# Patient Record
Sex: FEMALE | Race: BLACK OR AFRICAN AMERICAN | NOT HISPANIC OR LATINO | URBAN - METROPOLITAN AREA
[De-identification: names, ages, dates, MRNs, and addresses within clinical notes are randomized per-mention and may not be internally consistent; named-entity substitution may affect disease eponyms.]

---

## 2017-10-10 ENCOUNTER — OUTPATIENT (OUTPATIENT)
Dept: OUTPATIENT SERVICES | Facility: HOSPITAL | Age: 52
LOS: 1 days | Discharge: HOME | End: 2017-10-10
Payer: COMMERCIAL

## 2017-10-10 DIAGNOSIS — M06.9 RHEUMATOID ARTHRITIS, UNSPECIFIED: ICD-10-CM

## 2017-10-10 PROCEDURE — 96415 CHEMO IV INFUSION ADDL HR: CPT

## 2017-10-10 PROCEDURE — 96375 TX/PRO/DX INJ NEW DRUG ADDON: CPT

## 2017-10-10 PROCEDURE — 96413 CHEMO IV INFUSION 1 HR: CPT

## 2017-10-10 RX ORDER — RITUXIMAB 10 MG/ML
1000 INJECTION, SOLUTION INTRAVENOUS ONCE
Qty: 0 | Refills: 0 | Status: DISCONTINUED | OUTPATIENT
Start: 2017-10-10 | End: 2017-10-25

## 2017-10-10 RX ORDER — DIPHENHYDRAMINE HCL 50 MG
25 CAPSULE ORAL ONCE
Qty: 0 | Refills: 0 | Status: COMPLETED | OUTPATIENT
Start: 2017-10-10 | End: 2017-10-10

## 2017-10-10 RX ORDER — ACETAMINOPHEN 500 MG
975 TABLET ORAL ONCE
Qty: 0 | Refills: 0 | Status: COMPLETED | OUTPATIENT
Start: 2017-10-10 | End: 2017-10-10

## 2017-10-10 RX ADMIN — Medication 975 MILLIGRAM(S): at 11:07

## 2017-10-10 RX ADMIN — Medication 125 MILLIGRAM(S): at 11:07

## 2017-10-10 RX ADMIN — Medication 25 MILLIGRAM(S): at 11:07

## 2017-10-12 DIAGNOSIS — M06.9 RHEUMATOID ARTHRITIS, UNSPECIFIED: ICD-10-CM

## 2017-10-12 DIAGNOSIS — Z51.11 ENCOUNTER FOR ANTINEOPLASTIC CHEMOTHERAPY: ICD-10-CM

## 2017-10-24 ENCOUNTER — OUTPATIENT (OUTPATIENT)
Dept: OUTPATIENT SERVICES | Facility: HOSPITAL | Age: 52
LOS: 1 days | Discharge: HOME | End: 2017-10-24
Payer: COMMERCIAL

## 2017-10-24 DIAGNOSIS — M06.9 RHEUMATOID ARTHRITIS, UNSPECIFIED: ICD-10-CM

## 2017-10-24 PROCEDURE — 96375 TX/PRO/DX INJ NEW DRUG ADDON: CPT

## 2017-10-24 PROCEDURE — 96413 CHEMO IV INFUSION 1 HR: CPT

## 2017-10-24 PROCEDURE — 96415 CHEMO IV INFUSION ADDL HR: CPT

## 2017-10-24 RX ORDER — RITUXIMAB 10 MG/ML
1000 INJECTION, SOLUTION INTRAVENOUS ONCE
Qty: 0 | Refills: 0 | Status: DISCONTINUED | OUTPATIENT
Start: 2017-10-24 | End: 2017-11-10

## 2017-10-24 RX ORDER — ACETAMINOPHEN 500 MG
650 TABLET ORAL ONCE
Qty: 0 | Refills: 0 | Status: COMPLETED | OUTPATIENT
Start: 2017-10-24 | End: 2017-10-24

## 2017-10-24 RX ORDER — DIPHENHYDRAMINE HCL 50 MG
25 CAPSULE ORAL ONCE
Qty: 0 | Refills: 0 | Status: COMPLETED | OUTPATIENT
Start: 2017-10-24 | End: 2017-10-24

## 2017-10-24 RX ADMIN — Medication 650 MILLIGRAM(S): at 11:33

## 2017-10-24 RX ADMIN — Medication 125 MILLIGRAM(S): at 11:49

## 2017-10-24 RX ADMIN — Medication 25 MILLIGRAM(S): at 11:34

## 2017-10-30 DIAGNOSIS — M06.9 RHEUMATOID ARTHRITIS, UNSPECIFIED: ICD-10-CM

## 2017-10-30 DIAGNOSIS — Z51.11 ENCOUNTER FOR ANTINEOPLASTIC CHEMOTHERAPY: ICD-10-CM

## 2018-03-27 ENCOUNTER — INPATIENT (INPATIENT)
Facility: HOSPITAL | Age: 53
LOS: 3 days | Discharge: HOME | DRG: 419 | End: 2018-03-31
Attending: STUDENT IN AN ORGANIZED HEALTH CARE EDUCATION/TRAINING PROGRAM | Admitting: STUDENT IN AN ORGANIZED HEALTH CARE EDUCATION/TRAINING PROGRAM
Payer: COMMERCIAL

## 2018-03-27 VITALS
SYSTOLIC BLOOD PRESSURE: 122 MMHG | TEMPERATURE: 98 F | WEIGHT: 149.91 LBS | HEART RATE: 65 BPM | RESPIRATION RATE: 22 BRPM | OXYGEN SATURATION: 100 % | HEIGHT: 66 IN | DIASTOLIC BLOOD PRESSURE: 86 MMHG

## 2018-03-27 PROCEDURE — 99220: CPT

## 2018-03-27 NOTE — ED ADULT TRIAGE NOTE - CHIEF COMPLAINT QUOTE
Patient A&Ox4, BIBA complaining of 10/10 abdominal pain x2 hours. Also complaining of nausea. Patient A&Ox4, BIBA complaining of 10/10 abdominal pain x2 hours. Also complaining of nausea. Took Miralax.

## 2018-03-28 DIAGNOSIS — K81.0 ACUTE CHOLECYSTITIS: ICD-10-CM

## 2018-03-28 LAB
ALBUMIN SERPL ELPH-MCNC: 3.8 G/DL — SIGNIFICANT CHANGE UP (ref 3.3–5.2)
ALBUMIN SERPL ELPH-MCNC: 4.2 G/DL — SIGNIFICANT CHANGE UP (ref 3.3–5.2)
ALP SERPL-CCNC: 90 U/L — SIGNIFICANT CHANGE UP (ref 40–120)
ALP SERPL-CCNC: 91 U/L — SIGNIFICANT CHANGE UP (ref 40–120)
ALT FLD-CCNC: 25 U/L — SIGNIFICANT CHANGE UP
ALT FLD-CCNC: 30 U/L — SIGNIFICANT CHANGE UP
ANION GAP SERPL CALC-SCNC: 13 MMOL/L — SIGNIFICANT CHANGE UP (ref 5–17)
ANION GAP SERPL CALC-SCNC: 14 MMOL/L — SIGNIFICANT CHANGE UP (ref 5–17)
ANION GAP SERPL CALC-SCNC: 15 MMOL/L — SIGNIFICANT CHANGE UP (ref 5–17)
ANION GAP SERPL CALC-SCNC: 16 MMOL/L — SIGNIFICANT CHANGE UP (ref 5–17)
ANISOCYTOSIS BLD QL: SLIGHT — SIGNIFICANT CHANGE UP
APPEARANCE UR: CLEAR — SIGNIFICANT CHANGE UP
AST SERPL-CCNC: 26 U/L — SIGNIFICANT CHANGE UP
AST SERPL-CCNC: 32 U/L — HIGH
BASOPHILS # BLD AUTO: 0 K/UL — SIGNIFICANT CHANGE UP (ref 0–0.2)
BASOPHILS NFR BLD AUTO: 0.1 % — SIGNIFICANT CHANGE UP (ref 0–2)
BILIRUB SERPL-MCNC: 0.6 MG/DL — SIGNIFICANT CHANGE UP (ref 0.4–2)
BILIRUB SERPL-MCNC: <0.2 MG/DL — LOW (ref 0.4–2)
BILIRUB UR-MCNC: NEGATIVE — SIGNIFICANT CHANGE UP
BUN SERPL-MCNC: 16 MG/DL — SIGNIFICANT CHANGE UP (ref 8–20)
BUN SERPL-MCNC: 5 MG/DL — LOW (ref 8–20)
BUN SERPL-MCNC: 8 MG/DL — SIGNIFICANT CHANGE UP (ref 8–20)
BUN SERPL-MCNC: 8 MG/DL — SIGNIFICANT CHANGE UP (ref 8–20)
CALCIUM SERPL-MCNC: 8.2 MG/DL — LOW (ref 8.6–10.2)
CALCIUM SERPL-MCNC: 8.2 MG/DL — LOW (ref 8.6–10.2)
CALCIUM SERPL-MCNC: 8.7 MG/DL — SIGNIFICANT CHANGE UP (ref 8.6–10.2)
CALCIUM SERPL-MCNC: 9.5 MG/DL — SIGNIFICANT CHANGE UP (ref 8.6–10.2)
CHLORIDE SERPL-SCNC: 102 MMOL/L — SIGNIFICANT CHANGE UP (ref 98–107)
CHLORIDE SERPL-SCNC: 103 MMOL/L — SIGNIFICANT CHANGE UP (ref 98–107)
CHLORIDE SERPL-SCNC: 105 MMOL/L — SIGNIFICANT CHANGE UP (ref 98–107)
CHLORIDE SERPL-SCNC: 98 MMOL/L — SIGNIFICANT CHANGE UP (ref 98–107)
CO2 SERPL-SCNC: 22 MMOL/L — SIGNIFICANT CHANGE UP (ref 22–29)
CO2 SERPL-SCNC: 24 MMOL/L — SIGNIFICANT CHANGE UP (ref 22–29)
CO2 SERPL-SCNC: 26 MMOL/L — SIGNIFICANT CHANGE UP (ref 22–29)
CO2 SERPL-SCNC: 26 MMOL/L — SIGNIFICANT CHANGE UP (ref 22–29)
COLOR SPEC: YELLOW — SIGNIFICANT CHANGE UP
CREAT SERPL-MCNC: 0.47 MG/DL — LOW (ref 0.5–1.3)
CREAT SERPL-MCNC: 0.48 MG/DL — LOW (ref 0.5–1.3)
CREAT SERPL-MCNC: 0.5 MG/DL — SIGNIFICANT CHANGE UP (ref 0.5–1.3)
CREAT SERPL-MCNC: 0.64 MG/DL — SIGNIFICANT CHANGE UP (ref 0.5–1.3)
DIFF PNL FLD: ABNORMAL
EOSINOPHIL # BLD AUTO: 0 K/UL — SIGNIFICANT CHANGE UP (ref 0–0.5)
EOSINOPHIL NFR BLD AUTO: 0.1 % — SIGNIFICANT CHANGE UP (ref 0–6)
EPI CELLS # UR: ABNORMAL
GAS PNL BLDA: SIGNIFICANT CHANGE UP
GLUCOSE SERPL-MCNC: 100 MG/DL — SIGNIFICANT CHANGE UP (ref 70–115)
GLUCOSE SERPL-MCNC: 101 MG/DL — SIGNIFICANT CHANGE UP (ref 70–115)
GLUCOSE SERPL-MCNC: 153 MG/DL — HIGH (ref 70–115)
GLUCOSE SERPL-MCNC: 84 MG/DL — SIGNIFICANT CHANGE UP (ref 70–115)
GLUCOSE UR QL: NEGATIVE MG/DL — SIGNIFICANT CHANGE UP
HCT VFR BLD CALC: 38 % — SIGNIFICANT CHANGE UP (ref 37–47)
HCT VFR BLD CALC: 38.1 % — SIGNIFICANT CHANGE UP (ref 37–47)
HCT VFR BLD CALC: 38.4 % — SIGNIFICANT CHANGE UP (ref 37–47)
HGB BLD-MCNC: 12.1 G/DL — SIGNIFICANT CHANGE UP (ref 12–16)
HGB BLD-MCNC: 12.3 G/DL — SIGNIFICANT CHANGE UP (ref 12–16)
HGB BLD-MCNC: 12.3 G/DL — SIGNIFICANT CHANGE UP (ref 12–16)
HYPOCHROMIA BLD QL: SLIGHT — SIGNIFICANT CHANGE UP
KETONES UR-MCNC: NEGATIVE — SIGNIFICANT CHANGE UP
LACTATE BLDV-MCNC: 2.5 MMOL/L — HIGH (ref 0.5–2)
LACTATE BLDV-MCNC: 3.9 MMOL/L — HIGH (ref 0.5–2)
LACTATE SERPL-SCNC: 1.7 MMOL/L — SIGNIFICANT CHANGE UP (ref 0.5–2)
LACTATE SERPL-SCNC: 3.3 MMOL/L — HIGH (ref 0.5–2)
LEUKOCYTE ESTERASE UR-ACNC: NEGATIVE — SIGNIFICANT CHANGE UP
LIDOCAIN IGE QN: 34 U/L — SIGNIFICANT CHANGE UP (ref 22–51)
LYMPHOCYTES # BLD AUTO: 0.9 K/UL — LOW (ref 1–4.8)
LYMPHOCYTES # BLD AUTO: 9.8 % — LOW (ref 20–55)
MACROCYTES BLD QL: SLIGHT — SIGNIFICANT CHANGE UP
MAGNESIUM SERPL-MCNC: 1.9 MG/DL — SIGNIFICANT CHANGE UP (ref 1.6–2.6)
MAGNESIUM SERPL-MCNC: 2.1 MG/DL — SIGNIFICANT CHANGE UP (ref 1.8–2.6)
MCHC RBC-ENTMCNC: 21.9 PG — LOW (ref 27–31)
MCHC RBC-ENTMCNC: 22.1 PG — LOW (ref 27–31)
MCHC RBC-ENTMCNC: 22.1 PG — LOW (ref 27–31)
MCHC RBC-ENTMCNC: 31.8 G/DL — LOW (ref 32–36)
MCHC RBC-ENTMCNC: 32 G/DL — SIGNIFICANT CHANGE UP (ref 32–36)
MCHC RBC-ENTMCNC: 32.3 G/DL — SIGNIFICANT CHANGE UP (ref 32–36)
MCV RBC AUTO: 68.5 FL — LOW (ref 81–99)
MCV RBC AUTO: 68.8 FL — LOW (ref 81–99)
MCV RBC AUTO: 68.9 FL — LOW (ref 81–99)
MICROCYTES BLD QL: SLIGHT — SIGNIFICANT CHANGE UP
MONOCYTES # BLD AUTO: 0.4 K/UL — SIGNIFICANT CHANGE UP (ref 0–0.8)
MONOCYTES NFR BLD AUTO: 4.4 % — SIGNIFICANT CHANGE UP (ref 3–10)
NEUTROPHILS # BLD AUTO: 8.2 K/UL — HIGH (ref 1.8–8)
NEUTROPHILS NFR BLD AUTO: 85.4 % — HIGH (ref 37–73)
NITRITE UR-MCNC: NEGATIVE — SIGNIFICANT CHANGE UP
OVALOCYTES BLD QL SMEAR: SLIGHT — SIGNIFICANT CHANGE UP
PH UR: 8 — SIGNIFICANT CHANGE UP (ref 5–8)
PHOSPHATE SERPL-MCNC: 2 MG/DL — LOW (ref 2.4–4.7)
PHOSPHATE SERPL-MCNC: 2.1 MG/DL — LOW (ref 2.4–4.7)
PLAT MORPH BLD: NORMAL — SIGNIFICANT CHANGE UP
PLATELET # BLD AUTO: 228 K/UL — SIGNIFICANT CHANGE UP (ref 150–400)
PLATELET # BLD AUTO: 254 K/UL — SIGNIFICANT CHANGE UP (ref 150–400)
PLATELET # BLD AUTO: 272 K/UL — SIGNIFICANT CHANGE UP (ref 150–400)
POIKILOCYTOSIS BLD QL AUTO: SLIGHT — SIGNIFICANT CHANGE UP
POTASSIUM SERPL-MCNC: 3.3 MMOL/L — LOW (ref 3.5–5.3)
POTASSIUM SERPL-MCNC: 3.7 MMOL/L — SIGNIFICANT CHANGE UP (ref 3.5–5.3)
POTASSIUM SERPL-MCNC: 3.9 MMOL/L — SIGNIFICANT CHANGE UP (ref 3.5–5.3)
POTASSIUM SERPL-MCNC: 4.4 MMOL/L — SIGNIFICANT CHANGE UP (ref 3.5–5.3)
POTASSIUM SERPL-SCNC: 3.3 MMOL/L — LOW (ref 3.5–5.3)
POTASSIUM SERPL-SCNC: 3.7 MMOL/L — SIGNIFICANT CHANGE UP (ref 3.5–5.3)
POTASSIUM SERPL-SCNC: 3.9 MMOL/L — SIGNIFICANT CHANGE UP (ref 3.5–5.3)
POTASSIUM SERPL-SCNC: 4.4 MMOL/L — SIGNIFICANT CHANGE UP (ref 3.5–5.3)
PROT SERPL-MCNC: 7.1 G/DL — SIGNIFICANT CHANGE UP (ref 6.6–8.7)
PROT SERPL-MCNC: 7.4 G/DL — SIGNIFICANT CHANGE UP (ref 6.6–8.7)
PROT UR-MCNC: NEGATIVE MG/DL — SIGNIFICANT CHANGE UP
RBC # BLD: 5.52 M/UL — HIGH (ref 4.4–5.2)
RBC # BLD: 5.56 M/UL — HIGH (ref 4.4–5.2)
RBC # BLD: 5.57 M/UL — HIGH (ref 4.4–5.2)
RBC # FLD: 14.5 % — SIGNIFICANT CHANGE UP (ref 11–15.6)
RBC # FLD: 14.6 % — SIGNIFICANT CHANGE UP (ref 11–15.6)
RBC # FLD: 14.6 % — SIGNIFICANT CHANGE UP (ref 11–15.6)
RBC BLD AUTO: ABNORMAL
RBC CASTS # UR COMP ASSIST: SIGNIFICANT CHANGE UP /HPF (ref 0–4)
SODIUM SERPL-SCNC: 139 MMOL/L — SIGNIFICANT CHANGE UP (ref 135–145)
SODIUM SERPL-SCNC: 140 MMOL/L — SIGNIFICANT CHANGE UP (ref 135–145)
SODIUM SERPL-SCNC: 142 MMOL/L — SIGNIFICANT CHANGE UP (ref 135–145)
SODIUM SERPL-SCNC: 143 MMOL/L — SIGNIFICANT CHANGE UP (ref 135–145)
SP GR SPEC: 1.01 — SIGNIFICANT CHANGE UP (ref 1.01–1.02)
UROBILINOGEN FLD QL: NEGATIVE MG/DL — SIGNIFICANT CHANGE UP
WBC # BLD: 7.5 K/UL — SIGNIFICANT CHANGE UP (ref 4.8–10.8)
WBC # BLD: 7.7 K/UL — SIGNIFICANT CHANGE UP (ref 4.8–10.8)
WBC # BLD: 9.6 K/UL — SIGNIFICANT CHANGE UP (ref 4.8–10.8)
WBC # FLD AUTO: 7.5 K/UL — SIGNIFICANT CHANGE UP (ref 4.8–10.8)
WBC # FLD AUTO: 7.7 K/UL — SIGNIFICANT CHANGE UP (ref 4.8–10.8)
WBC # FLD AUTO: 9.6 K/UL — SIGNIFICANT CHANGE UP (ref 4.8–10.8)
WBC UR QL: NEGATIVE — SIGNIFICANT CHANGE UP

## 2018-03-28 PROCEDURE — 74177 CT ABD & PELVIS W/CONTRAST: CPT | Mod: 26

## 2018-03-28 PROCEDURE — 76700 US EXAM ABDOM COMPLETE: CPT | Mod: 26

## 2018-03-28 RX ORDER — TRAMADOL HYDROCHLORIDE 50 MG/1
50 TABLET ORAL THREE TIMES A DAY
Qty: 0 | Refills: 0 | Status: DISCONTINUED | OUTPATIENT
Start: 2018-03-28 | End: 2018-03-30

## 2018-03-28 RX ORDER — ATORVASTATIN CALCIUM 80 MG/1
10 TABLET, FILM COATED ORAL AT BEDTIME
Qty: 0 | Refills: 0 | Status: DISCONTINUED | OUTPATIENT
Start: 2018-03-28 | End: 2018-03-30

## 2018-03-28 RX ORDER — MORPHINE SULFATE 50 MG/1
4 CAPSULE, EXTENDED RELEASE ORAL EVERY 6 HOURS
Qty: 0 | Refills: 0 | Status: DISCONTINUED | OUTPATIENT
Start: 2018-03-28 | End: 2018-03-28

## 2018-03-28 RX ORDER — SODIUM CHLORIDE 9 MG/ML
1000 INJECTION, SOLUTION INTRAVENOUS
Qty: 0 | Refills: 0 | Status: DISCONTINUED | OUTPATIENT
Start: 2018-03-28 | End: 2018-03-30

## 2018-03-28 RX ORDER — CYCLOBENZAPRINE HYDROCHLORIDE 10 MG/1
1 TABLET, FILM COATED ORAL
Qty: 0 | Refills: 0 | COMMUNITY

## 2018-03-28 RX ORDER — AMLODIPINE BESYLATE 2.5 MG/1
5 TABLET ORAL DAILY
Qty: 0 | Refills: 0 | Status: DISCONTINUED | OUTPATIENT
Start: 2018-03-28 | End: 2018-03-30

## 2018-03-28 RX ORDER — MORPHINE SULFATE 50 MG/1
2 CAPSULE, EXTENDED RELEASE ORAL EVERY 4 HOURS
Qty: 0 | Refills: 0 | Status: DISCONTINUED | OUTPATIENT
Start: 2018-03-28 | End: 2018-03-30

## 2018-03-28 RX ORDER — SODIUM CHLORIDE 9 MG/ML
1000 INJECTION INTRAMUSCULAR; INTRAVENOUS; SUBCUTANEOUS ONCE
Qty: 0 | Refills: 0 | Status: COMPLETED | OUTPATIENT
Start: 2018-03-28 | End: 2018-03-28

## 2018-03-28 RX ORDER — MORPHINE SULFATE 50 MG/1
4 CAPSULE, EXTENDED RELEASE ORAL ONCE
Qty: 0 | Refills: 0 | Status: DISCONTINUED | OUTPATIENT
Start: 2018-03-28 | End: 2018-03-28

## 2018-03-28 RX ORDER — LEVOTHYROXINE SODIUM 125 MCG
1 TABLET ORAL
Qty: 0 | Refills: 0 | COMMUNITY

## 2018-03-28 RX ORDER — UBIDECARENONE 100 MG
200 CAPSULE ORAL
Qty: 0 | Refills: 0 | COMMUNITY

## 2018-03-28 RX ORDER — LOSARTAN POTASSIUM 100 MG/1
100 TABLET, FILM COATED ORAL DAILY
Qty: 0 | Refills: 0 | Status: DISCONTINUED | OUTPATIENT
Start: 2018-03-28 | End: 2018-03-30

## 2018-03-28 RX ORDER — LOSARTAN/HYDROCHLOROTHIAZIDE 100MG-25MG
1 TABLET ORAL
Qty: 0 | Refills: 0 | COMMUNITY

## 2018-03-28 RX ORDER — UBIDECARENONE 100 MG
1 CAPSULE ORAL
Qty: 0 | Refills: 0 | COMMUNITY

## 2018-03-28 RX ORDER — ONDANSETRON 8 MG/1
4 TABLET, FILM COATED ORAL EVERY 6 HOURS
Qty: 0 | Refills: 0 | Status: COMPLETED | OUTPATIENT
Start: 2018-03-28 | End: 2018-03-28

## 2018-03-28 RX ORDER — HYDROCHLOROTHIAZIDE 25 MG
12.5 TABLET ORAL DAILY
Qty: 0 | Refills: 0 | Status: DISCONTINUED | OUTPATIENT
Start: 2018-03-28 | End: 2018-03-30

## 2018-03-28 RX ORDER — ENOXAPARIN SODIUM 100 MG/ML
40 INJECTION SUBCUTANEOUS DAILY
Qty: 0 | Refills: 0 | Status: DISCONTINUED | OUTPATIENT
Start: 2018-03-28 | End: 2018-03-30

## 2018-03-28 RX ORDER — SODIUM CHLORIDE 9 MG/ML
1000 INJECTION, SOLUTION INTRAVENOUS ONCE
Qty: 0 | Refills: 0 | Status: COMPLETED | OUTPATIENT
Start: 2018-03-28 | End: 2018-03-28

## 2018-03-28 RX ORDER — TRAMADOL HYDROCHLORIDE 50 MG/1
1 TABLET ORAL
Qty: 0 | Refills: 0 | COMMUNITY

## 2018-03-28 RX ORDER — LEVOTHYROXINE SODIUM 125 MCG
75 TABLET ORAL DAILY
Qty: 0 | Refills: 0 | Status: DISCONTINUED | OUTPATIENT
Start: 2018-03-28 | End: 2018-03-30

## 2018-03-28 RX ORDER — GABAPENTIN 400 MG/1
1 CAPSULE ORAL
Qty: 0 | Refills: 0 | COMMUNITY

## 2018-03-28 RX ORDER — MORPHINE SULFATE 50 MG/1
4 CAPSULE, EXTENDED RELEASE ORAL EVERY 4 HOURS
Qty: 0 | Refills: 0 | Status: DISCONTINUED | OUTPATIENT
Start: 2018-03-28 | End: 2018-03-30

## 2018-03-28 RX ORDER — CYCLOBENZAPRINE HYDROCHLORIDE 10 MG/1
10 TABLET, FILM COATED ORAL THREE TIMES A DAY
Qty: 0 | Refills: 0 | Status: DISCONTINUED | OUTPATIENT
Start: 2018-03-28 | End: 2018-03-30

## 2018-03-28 RX ORDER — ATORVASTATIN CALCIUM 80 MG/1
1 TABLET, FILM COATED ORAL
Qty: 0 | Refills: 0 | COMMUNITY

## 2018-03-28 RX ORDER — POTASSIUM CHLORIDE 20 MEQ
10 PACKET (EA) ORAL
Qty: 0 | Refills: 0 | Status: COMPLETED | OUTPATIENT
Start: 2018-03-28 | End: 2018-03-28

## 2018-03-28 RX ORDER — PIPERACILLIN AND TAZOBACTAM 4; .5 G/20ML; G/20ML
3.38 INJECTION, POWDER, LYOPHILIZED, FOR SOLUTION INTRAVENOUS EVERY 8 HOURS
Qty: 0 | Refills: 0 | Status: DISCONTINUED | OUTPATIENT
Start: 2018-03-28 | End: 2018-03-30

## 2018-03-28 RX ORDER — DOCUSATE SODIUM 100 MG
100 CAPSULE ORAL THREE TIMES A DAY
Qty: 0 | Refills: 0 | Status: DISCONTINUED | OUTPATIENT
Start: 2018-03-28 | End: 2018-03-30

## 2018-03-28 RX ORDER — AMLODIPINE BESYLATE 2.5 MG/1
1 TABLET ORAL
Qty: 0 | Refills: 0 | COMMUNITY

## 2018-03-28 RX ORDER — GABAPENTIN 400 MG/1
300 CAPSULE ORAL THREE TIMES A DAY
Qty: 0 | Refills: 0 | Status: DISCONTINUED | OUTPATIENT
Start: 2018-03-28 | End: 2018-03-30

## 2018-03-28 RX ADMIN — MORPHINE SULFATE 4 MILLIGRAM(S): 50 CAPSULE, EXTENDED RELEASE ORAL at 01:05

## 2018-03-28 RX ADMIN — SODIUM CHLORIDE 175 MILLILITER(S): 9 INJECTION, SOLUTION INTRAVENOUS at 15:49

## 2018-03-28 RX ADMIN — ENOXAPARIN SODIUM 40 MILLIGRAM(S): 100 INJECTION SUBCUTANEOUS at 15:48

## 2018-03-28 RX ADMIN — SODIUM CHLORIDE 2000 MILLILITER(S): 9 INJECTION, SOLUTION INTRAVENOUS at 04:50

## 2018-03-28 RX ADMIN — MORPHINE SULFATE 4 MILLIGRAM(S): 50 CAPSULE, EXTENDED RELEASE ORAL at 01:55

## 2018-03-28 RX ADMIN — Medication 100 MILLIEQUIVALENT(S): at 08:19

## 2018-03-28 RX ADMIN — GABAPENTIN 300 MILLIGRAM(S): 400 CAPSULE ORAL at 22:15

## 2018-03-28 RX ADMIN — SODIUM CHLORIDE 1000 MILLILITER(S): 9 INJECTION, SOLUTION INTRAVENOUS at 11:06

## 2018-03-28 RX ADMIN — ATORVASTATIN CALCIUM 10 MILLIGRAM(S): 80 TABLET, FILM COATED ORAL at 22:15

## 2018-03-28 RX ADMIN — MORPHINE SULFATE 4 MILLIGRAM(S): 50 CAPSULE, EXTENDED RELEASE ORAL at 04:50

## 2018-03-28 RX ADMIN — MORPHINE SULFATE 4 MILLIGRAM(S): 50 CAPSULE, EXTENDED RELEASE ORAL at 05:44

## 2018-03-28 RX ADMIN — Medication 100 MILLIEQUIVALENT(S): at 06:54

## 2018-03-28 RX ADMIN — SODIUM CHLORIDE 1000 MILLILITER(S): 9 INJECTION INTRAMUSCULAR; INTRAVENOUS; SUBCUTANEOUS at 01:05

## 2018-03-28 RX ADMIN — PIPERACILLIN AND TAZOBACTAM 25 GRAM(S): 4; .5 INJECTION, POWDER, LYOPHILIZED, FOR SOLUTION INTRAVENOUS at 15:32

## 2018-03-28 RX ADMIN — SODIUM CHLORIDE 1000 MILLILITER(S): 9 INJECTION INTRAMUSCULAR; INTRAVENOUS; SUBCUTANEOUS at 00:32

## 2018-03-28 RX ADMIN — Medication 100 MILLIGRAM(S): at 22:15

## 2018-03-28 RX ADMIN — SODIUM CHLORIDE 2000 MILLILITER(S): 9 INJECTION, SOLUTION INTRAVENOUS at 07:06

## 2018-03-28 RX ADMIN — Medication 100 MILLIEQUIVALENT(S): at 05:59

## 2018-03-28 NOTE — H&P ADULT - NSHPPHYSICALEXAM_GEN_ALL_CORE
Vital Signs Last 24 Hrs  T(C): 36.4 (28 Mar 2018 02:07), Max: 36.4 (27 Mar 2018 23:40)  T(F): 97.5 (28 Mar 2018 02:07), Max: 97.5 (27 Mar 2018 23:40)  HR: 99 (28 Mar 2018 04:50) (65 - 102)  BP: 129/91 (28 Mar 2018 04:50) (121/91 - 129/91)  BP(mean): --  RR: 20 (28 Mar 2018 04:50) (20 - 22)  SpO2: 99% (28 Mar 2018 04:50) (99% - 100%)    Physical Exam:    Constitutional: NAD  HEENT: PERRL, EOMI  Neck: No JVD, FROM without pain  Respiratory: Breath Sounds equal & clear to auscultation, no accessory muscle use  Cardiovascular: Regular rate & rhythm, S1, S2  Gastrointestinal: Soft, ND, min ttp upper abd, greatest RUQ no rebound   Extremities: No peripheral edema, No cyanosis  Neurological: A&O x 3; without gross deficit, GCS: 15  Musculoskeletal: No joint pain, swelling, deformity, or point tenderness; no limitation of movement

## 2018-03-28 NOTE — ED PROVIDER NOTE - PROGRESS NOTE DETAILS
Patient's vomiting has improved but she continues to have abdominal pain and tenderness. Labs and CT are as noted. Surgery consultation requested due to concern of early obstruction.

## 2018-03-28 NOTE — H&P ADULT - NSHPLABSRESULTS_GEN_ALL_CORE
LABS:                        12.1   7.7   )-----------( 254      ( 28 Mar 2018 00:41 )             38.0         139  |  98  |  16.0  ----------------------------<  153<H>  3.3<L>   |  26.0  |  0.64    Ca    9.5      28 Mar 2018 00:41    TPro  7.4  /  Alb  4.2  /  TBili  <0.2<L>  /  DBili  x   /  AST  32<H>  /  ALT  30  /  AlkPhos  91        Urinalysis Basic - ( 28 Mar 2018 01:30 )    Color: Yellow / Appearance: Clear / S.015 / pH: x  Gluc: x / Ketone: Negative  / Bili: Negative / Urobili: Negative mg/dL   Blood: x / Protein: Negative mg/dL / Nitrite: Negative   Leuk Esterase: Negative / RBC: 0-2 /HPF / WBC Negative   Sq Epi: x / Non Sq Epi: Moderate / Bacteria: x

## 2018-03-28 NOTE — ED ADULT NURSE REASSESSMENT NOTE - NS ED NURSE REASSESS COMMENT FT1
pt pending Surgery consult. per MD, plasmalyte bolus initiated @ this time. pt in no apparent distress or discomfort, will continue to monitor.

## 2018-03-28 NOTE — ED CDU PROVIDER INITIAL DAY NOTE - PROGRESS NOTE DETAILS
US shows questionable acute cholecystitis and repeat lactate increased to 3.3 despite IV fluids. I discussed with surgery team who will evaluate the patient again now. I discussed with surgical team who will admit patient.

## 2018-03-28 NOTE — ED CDU PROVIDER INITIAL DAY NOTE - OBJECTIVE STATEMENT
51 y/o F pt BIBA with hx of RA, HTN, and hypothyroidism presents to ED c/o abdominal pain associated with vomiting that began at 20:00 today.  Denies diarrhea, fever, chills, SOB and CP. No further complaints at this time.  Patient on rituxinab since october 2017.  PMD: Dr. Kingsley

## 2018-03-28 NOTE — ED CDU PROVIDER INITIAL DAY NOTE - DETAILS
Patient with (+) CT findings. Patient pending repeat abdominal examinations and surgical consultation.

## 2018-03-28 NOTE — ED PROVIDER NOTE - CONSTITUTIONAL, MLM
normal... Uncomfortable appearing, well nourished, awake, alert, oriented to person, place, time/situation and in moderate painful distress.

## 2018-03-28 NOTE — ED PROVIDER NOTE - OBJECTIVE STATEMENT
53 y/o F pt BIBA with hx of RA, HTN, and hypothyroidism presents to ED c/o abdominal pain associated with vomiting that began at 20:00 today.  Denies diarrhea, fever, chills, SOB and CP. No further complaints at this time.   PMD: Dr. Kingsley 53 y/o F pt BIBA with hx of RA, HTN, and hypothyroidism presents to ED c/o abdominal pain associated with vomiting that began at 20:00 today.  Denies diarrhea, fever, chills, SOB and CP. No further complaints at this time.  Patient on rituxinab since october 2017.  PMD: Dr. Kingsley

## 2018-03-28 NOTE — H&P ADULT - ATTENDING COMMENTS
The patient was seen and examined  Details per the PA's H&P  This is a 52-year old woman who presents to the ED with abdominal pain and vomiting  The patient had been well till the day prior to admission  She developed acute onset of upper abdominal pain, post prandially  The pain was followed by vomiting  The patient history is significant for RA    Exam:  Afebrile  Anicteric  Abdomen is soft, mild RUQ-epigastric tenderness.  No brown's sign    Labs:  WBC=7.7  Hepatic profile is normal  Lactate=3.9---2.5 mmol/L    CT images reviewed    Impression:    Abdominal pain of unclear etiology  I am concerned about biliary disease given the symptoms, physical findings, and CT findings    Plan:  GB ultrasound:  if positive, consider for cholecystectomy.  If negative and lactate clears, would offer the patient a diet and monitor for tolerance.

## 2018-03-28 NOTE — H&P ADULT - HISTORY OF PRESENT ILLNESS
Patient 52F with generalized abdominal pain starting around 4pm today. Admits to 7/10 dull generalized abd pain associated nausea and vomiting x2 episodes. Pain greatest in upper abd. No similar episodes in past. No history of abd surgery. Does have long standing h/o chronic constipation due to opoid use. Took miralax earlier today and had normal BM thereafter. Admits to passing flatus.  no diarrhea, no urinary symptoms, no blood in bm, no dizziness. feels dehydrated but also willing to eat     lactate noted at 3.9 with improvement to 2.5 after IVF. additional bolus ordered and started. K riders ordered Patient 52F with generalized abdominal pain starting around 4pm today. Admits to 7/10 dull generalized abd pain associated nausea and vomiting x2 episodes. Pain greatest in upper abd. No similar episodes in past. No history of abd surgery. Does have long standing h/o chronic constipation due to opoid use. Took miralax earlier today and had normal BM thereafter. Admits to passing flatus.  no diarrhea, no urinary symptoms, no blood in bm, no dizziness. feels dehydrated but also willing to eat     lactate noted at 3.9 with improvement to 2.5 after IVF. additional bolus ordered and started. K riders ordered     Note, in addition to the medications listed below, pt takes Rituximab, next dose due April 24 2018

## 2018-03-28 NOTE — ED ADULT NURSE REASSESSMENT NOTE - NS ED NURSE REASSESS COMMENT FT1
Assumed care of patient at this time, patient is resting in bed with IV fluids infusing and potassium rider infusing. Surgery resident at the bedside with patient. Patient waiting for US. She denies any pain at this time. NAD noted. Will continue to monitor.

## 2018-03-28 NOTE — ED CDU PROVIDER DISPOSITION NOTE - CLINICAL COURSE
Patient underwent US with increasing lactic acid suggestive of acute cholecystitis. She was evaluated by surgery and they agree to accept her for admission.

## 2018-03-28 NOTE — ED ADULT NURSE NOTE - CHIEF COMPLAINT QUOTE
Patient A&Ox4, BIBA complaining of 10/10 abdominal pain x2 hours. Also complaining of nausea. Took Miralax.

## 2018-03-28 NOTE — H&P ADULT - ASSESSMENT
52F generalize abd pain   enteritis  with possible gallbladder pathology       - pain control   repeat abd exam   ivf   stat abd US r/o gallbladder disease   repeat labs and lactate   KCl ordered for hypokalemia   if lactate clears and ab US without significant finding attempt diet tolerance, if tolerate plan for dc home     seen and examined with dr weeks

## 2018-03-28 NOTE — CHART NOTE - NSCHARTNOTEFT_GEN_A_CORE
patient seen and examined approx 0450. Had just received morphine IVP approx 40min prior to arrival. States pain is improved at this moment and feels less weak after hydration. Able to ambulate independently to the bathroom. Voiding without difficulty twice since arrival to ED.       Patient 49F with generalized abdominal pain starting around 4pm today. Admits to 7/10 dull generalized abd pain associated nausea and vomiting x2 episodes. No similar episodes in past. No history of abd surgery. Does have long standing h/o chronic constipation due to opoid use. Took miralax earlier today and had normal BM thereafter. Admits to passing flatus.      lactate noted at 3.9 with improvement to 2.5 after IVF. additional bolus ordered and started. Will repeat exam.

## 2018-03-28 NOTE — ED ADULT NURSE REASSESSMENT NOTE - NS ED NURSE REASSESS COMMENT FT1
pt reports feeling much better than when first arriving to ED, lying comfortably in bed with family @ bedside. pt in no apparent distress or discomfort, will continue to monitor.

## 2018-03-28 NOTE — ED ADULT NURSE NOTE - OBJECTIVE STATEMENT
pt a+ox4, presents to ED c/o n/v. pt reports sudden onset of vomiting after eating dinner this evening. pt reports extreme abd pain, unable to tolerate any PO intake. pt denies recent fever, chills, night sweats, headache dizziness lightheadedness, sick contacts, recent travel or recent illness. pt reports hx of RA, states she has been prescribed a lot of pain medications, unable to recall ANY of medication names, and has been constipated. pt states she was advised to take Miralax so she did earlier today. pt actively vomiting, crying and reports severe abd pain. pt awaiting MD hughes, will continue to monitor.

## 2018-03-29 ENCOUNTER — TRANSCRIPTION ENCOUNTER (OUTPATIENT)
Age: 53
End: 2018-03-29

## 2018-03-29 LAB
ANION GAP SERPL CALC-SCNC: 11 MMOL/L — SIGNIFICANT CHANGE UP (ref 5–17)
ANION GAP SERPL CALC-SCNC: 16 MMOL/L — SIGNIFICANT CHANGE UP (ref 5–17)
APTT BLD: 35.4 SEC — SIGNIFICANT CHANGE UP (ref 27.5–37.4)
BASOPHILS # BLD AUTO: 0 K/UL — SIGNIFICANT CHANGE UP (ref 0–0.2)
BASOPHILS NFR BLD AUTO: 0.2 % — SIGNIFICANT CHANGE UP (ref 0–2)
BLD GP AB SCN SERPL QL: SIGNIFICANT CHANGE UP
BUN SERPL-MCNC: 6 MG/DL — LOW (ref 8–20)
BUN SERPL-MCNC: 7 MG/DL — LOW (ref 8–20)
CALCIUM SERPL-MCNC: 8.4 MG/DL — LOW (ref 8.6–10.2)
CALCIUM SERPL-MCNC: 8.7 MG/DL — SIGNIFICANT CHANGE UP (ref 8.6–10.2)
CHLORIDE SERPL-SCNC: 101 MMOL/L — SIGNIFICANT CHANGE UP (ref 98–107)
CHLORIDE SERPL-SCNC: 102 MMOL/L — SIGNIFICANT CHANGE UP (ref 98–107)
CO2 SERPL-SCNC: 24 MMOL/L — SIGNIFICANT CHANGE UP (ref 22–29)
CO2 SERPL-SCNC: 26 MMOL/L — SIGNIFICANT CHANGE UP (ref 22–29)
CREAT SERPL-MCNC: 0.61 MG/DL — SIGNIFICANT CHANGE UP (ref 0.5–1.3)
CREAT SERPL-MCNC: 0.62 MG/DL — SIGNIFICANT CHANGE UP (ref 0.5–1.3)
EOSINOPHIL # BLD AUTO: 0.1 K/UL — SIGNIFICANT CHANGE UP (ref 0–0.5)
EOSINOPHIL NFR BLD AUTO: 2 % — SIGNIFICANT CHANGE UP (ref 0–6)
GLUCOSE SERPL-MCNC: 102 MG/DL — SIGNIFICANT CHANGE UP (ref 70–115)
GLUCOSE SERPL-MCNC: 91 MG/DL — SIGNIFICANT CHANGE UP (ref 70–115)
HCT VFR BLD CALC: 34.1 % — LOW (ref 37–47)
HCT VFR BLD CALC: 35.9 % — LOW (ref 37–47)
HGB BLD-MCNC: 10.9 G/DL — LOW (ref 12–16)
HGB BLD-MCNC: 11.7 G/DL — LOW (ref 12–16)
INR BLD: 1.14 RATIO — SIGNIFICANT CHANGE UP (ref 0.88–1.16)
LYMPHOCYTES # BLD AUTO: 1 K/UL — SIGNIFICANT CHANGE UP (ref 1–4.8)
LYMPHOCYTES # BLD AUTO: 15.6 % — LOW (ref 20–55)
MAGNESIUM SERPL-MCNC: 2.1 MG/DL — SIGNIFICANT CHANGE UP (ref 1.6–2.6)
MAGNESIUM SERPL-MCNC: 2.1 MG/DL — SIGNIFICANT CHANGE UP (ref 1.8–2.6)
MCHC RBC-ENTMCNC: 21.8 PG — LOW (ref 27–31)
MCHC RBC-ENTMCNC: 22.2 PG — LOW (ref 27–31)
MCHC RBC-ENTMCNC: 32 G/DL — SIGNIFICANT CHANGE UP (ref 32–36)
MCHC RBC-ENTMCNC: 32.6 G/DL — SIGNIFICANT CHANGE UP (ref 32–36)
MCV RBC AUTO: 68.2 FL — LOW (ref 81–99)
MCV RBC AUTO: 68.3 FL — LOW (ref 81–99)
MONOCYTES # BLD AUTO: 0.5 K/UL — SIGNIFICANT CHANGE UP (ref 0–0.8)
MONOCYTES NFR BLD AUTO: 7.3 % — SIGNIFICANT CHANGE UP (ref 3–10)
NEUTROPHILS # BLD AUTO: 4.9 K/UL — SIGNIFICANT CHANGE UP (ref 1.8–8)
NEUTROPHILS NFR BLD AUTO: 74.6 % — HIGH (ref 37–73)
PHOSPHATE SERPL-MCNC: 2.3 MG/DL — LOW (ref 2.4–4.7)
PHOSPHATE SERPL-MCNC: 3.4 MG/DL — SIGNIFICANT CHANGE UP (ref 2.4–4.7)
PLATELET # BLD AUTO: 249 K/UL — SIGNIFICANT CHANGE UP (ref 150–400)
PLATELET # BLD AUTO: 253 K/UL — SIGNIFICANT CHANGE UP (ref 150–400)
POTASSIUM SERPL-MCNC: 3.5 MMOL/L — SIGNIFICANT CHANGE UP (ref 3.5–5.3)
POTASSIUM SERPL-MCNC: 3.7 MMOL/L — SIGNIFICANT CHANGE UP (ref 3.5–5.3)
POTASSIUM SERPL-SCNC: 3.5 MMOL/L — SIGNIFICANT CHANGE UP (ref 3.5–5.3)
POTASSIUM SERPL-SCNC: 3.7 MMOL/L — SIGNIFICANT CHANGE UP (ref 3.5–5.3)
PROTHROM AB SERPL-ACNC: 12.6 SEC — SIGNIFICANT CHANGE UP (ref 9.8–12.7)
RBC # BLD: 5 M/UL — SIGNIFICANT CHANGE UP (ref 4.4–5.2)
RBC # BLD: 5.26 M/UL — HIGH (ref 4.4–5.2)
RBC # FLD: 14.8 % — SIGNIFICANT CHANGE UP (ref 11–15.6)
RBC # FLD: 14.8 % — SIGNIFICANT CHANGE UP (ref 11–15.6)
SODIUM SERPL-SCNC: 139 MMOL/L — SIGNIFICANT CHANGE UP (ref 135–145)
SODIUM SERPL-SCNC: 141 MMOL/L — SIGNIFICANT CHANGE UP (ref 135–145)
TYPE + AB SCN PNL BLD: SIGNIFICANT CHANGE UP
WBC # BLD: 6.5 K/UL — SIGNIFICANT CHANGE UP (ref 4.8–10.8)
WBC # BLD: 6.9 K/UL — SIGNIFICANT CHANGE UP (ref 4.8–10.8)
WBC # FLD AUTO: 6.5 K/UL — SIGNIFICANT CHANGE UP (ref 4.8–10.8)
WBC # FLD AUTO: 6.9 K/UL — SIGNIFICANT CHANGE UP (ref 4.8–10.8)

## 2018-03-29 PROCEDURE — 76705 ECHO EXAM OF ABDOMEN: CPT | Mod: 26

## 2018-03-29 RX ORDER — POTASSIUM PHOSPHATE, MONOBASIC POTASSIUM PHOSPHATE, DIBASIC 236; 224 MG/ML; MG/ML
30 INJECTION, SOLUTION INTRAVENOUS ONCE
Qty: 0 | Refills: 0 | Status: COMPLETED | OUTPATIENT
Start: 2018-03-29 | End: 2018-03-29

## 2018-03-29 RX ADMIN — Medication 75 MICROGRAM(S): at 06:27

## 2018-03-29 RX ADMIN — GABAPENTIN 300 MILLIGRAM(S): 400 CAPSULE ORAL at 06:27

## 2018-03-29 RX ADMIN — ATORVASTATIN CALCIUM 10 MILLIGRAM(S): 80 TABLET, FILM COATED ORAL at 20:46

## 2018-03-29 RX ADMIN — Medication 100 MILLIGRAM(S): at 14:46

## 2018-03-29 RX ADMIN — GABAPENTIN 300 MILLIGRAM(S): 400 CAPSULE ORAL at 20:46

## 2018-03-29 RX ADMIN — Medication 100 MILLIGRAM(S): at 20:46

## 2018-03-29 RX ADMIN — PIPERACILLIN AND TAZOBACTAM 25 GRAM(S): 4; .5 INJECTION, POWDER, LYOPHILIZED, FOR SOLUTION INTRAVENOUS at 20:38

## 2018-03-29 RX ADMIN — CYCLOBENZAPRINE HYDROCHLORIDE 10 MILLIGRAM(S): 10 TABLET, FILM COATED ORAL at 18:44

## 2018-03-29 RX ADMIN — PIPERACILLIN AND TAZOBACTAM 25 GRAM(S): 4; .5 INJECTION, POWDER, LYOPHILIZED, FOR SOLUTION INTRAVENOUS at 14:46

## 2018-03-29 RX ADMIN — Medication 100 MILLIGRAM(S): at 06:29

## 2018-03-29 RX ADMIN — ENOXAPARIN SODIUM 40 MILLIGRAM(S): 100 INJECTION SUBCUTANEOUS at 12:26

## 2018-03-29 RX ADMIN — GABAPENTIN 300 MILLIGRAM(S): 400 CAPSULE ORAL at 14:46

## 2018-03-29 RX ADMIN — PIPERACILLIN AND TAZOBACTAM 25 GRAM(S): 4; .5 INJECTION, POWDER, LYOPHILIZED, FOR SOLUTION INTRAVENOUS at 06:27

## 2018-03-29 RX ADMIN — LOSARTAN POTASSIUM 100 MILLIGRAM(S): 100 TABLET, FILM COATED ORAL at 06:28

## 2018-03-29 RX ADMIN — Medication 12.5 MILLIGRAM(S): at 06:29

## 2018-03-29 RX ADMIN — PIPERACILLIN AND TAZOBACTAM 25 GRAM(S): 4; .5 INJECTION, POWDER, LYOPHILIZED, FOR SOLUTION INTRAVENOUS at 00:33

## 2018-03-29 RX ADMIN — SODIUM CHLORIDE 175 MILLILITER(S): 9 INJECTION, SOLUTION INTRAVENOUS at 20:37

## 2018-03-29 RX ADMIN — AMLODIPINE BESYLATE 5 MILLIGRAM(S): 2.5 TABLET ORAL at 06:28

## 2018-03-29 RX ADMIN — POTASSIUM PHOSPHATE, MONOBASIC POTASSIUM PHOSPHATE, DIBASIC 85 MILLIMOLE(S): 236; 224 INJECTION, SOLUTION INTRAVENOUS at 12:26

## 2018-03-29 NOTE — PROGRESS NOTE ADULT - ASSESSMENT
51 y/o F with abdominal pain of unclear etiology, concern for biliary disease given clinical and radiologic findings. Hemodynamically stable. Lactate normalized. Afebrile.    Plan:  -NPO/IVF  -Continue pain control as needed  -Repeat gallbladder U/S today  -Encourage OOB, ambulation, IS  -Monitor vitals, I/Os  -Replete lytes as needed  -Continue home medications  -DVT ppx

## 2018-03-29 NOTE — PROGRESS NOTE ADULT - ATTENDING COMMENTS
avss  lactate cleared  abd soft minimal ttp at this point to ruq  labs / imaging reviewed  plan  d/w pt for possible cholecystectomy , she has agreed , planning for surgery tomorrow.  cont with iv abx.

## 2018-03-29 NOTE — PROGRESS NOTE ADULT - SUBJECTIVE AND OBJECTIVE BOX
INTERVAL HPI/OVERNIGHT EVENTS: No acute events overnight.    SUBJECTIVE: Continued RUQ pain, no n/v/d. No fever, chills, chest pain, dyspnea. Ambulating. Adequate urine output.       MEDICATIONS  (STANDING):  amLODIPine   Tablet 5 milliGRAM(s) Oral daily  atorvastatin 10 milliGRAM(s) Oral at bedtime  docusate sodium 100 milliGRAM(s) Oral three times a day  enoxaparin Injectable 40 milliGRAM(s) SubCutaneous daily  gabapentin 300 milliGRAM(s) Oral three times a day  hydrochlorothiazide 12.5 milliGRAM(s) Oral daily  levothyroxine 75 MICROGram(s) Oral daily  losartan 100 milliGRAM(s) Oral daily  multiple electrolytes Injection Type 1 1000 milliLiter(s) (175 mL/Hr) IV Continuous <Continuous>  piperacillin/tazobactam IVPB. 3.375 Gram(s) IV Intermittent every 8 hours    MEDICATIONS  (PRN):  cyclobenzaprine 10 milliGRAM(s) Oral three times a day PRN Muscle Spasm  morphine  - Injectable 2 milliGRAM(s) IV Push every 4 hours PRN Moderate Pain (4 - 6)  morphine  - Injectable 4 milliGRAM(s) IV Push every 4 hours PRN Severe Pain (7 - 10)  traMADol 50 milliGRAM(s) Oral three times a day PRN Mild Pain (1 - 3)      Vital Signs Last 24 Hrs  T(C): 37.2 (29 Mar 2018 07:56), Max: 37.3 (29 Mar 2018 05:00)  T(F): 99 (29 Mar 2018 07:56), Max: 99.1 (29 Mar 2018 05:00)  HR: 116 (29 Mar 2018 07:56) (91 - 116)  BP: 117/82 (29 Mar 2018 07:56) (109/76 - 135/97)  BP(mean): --  RR: 19 (29 Mar 2018 07:56) (18 - 19)  SpO2: 100% (29 Mar 2018 07:56) (99% - 100%)    Physical exam:  General: NAD, AOx3, resting comfortably in bed  HEENT: PERRLA, EOMI  Neck: supple, nontender  Respiratory: no respiratory distress, lungs CTAB  Heart: regular rate and rhythm, no murmurs  Abdomen: soft, mild tenderness RUQ, no Negron's sign, nondistended. Normal bowel sounds. No guarding or rebound.  Extremities: no peripheral edema. Normal ROM.      I&O's Detail    28 Mar 2018 07:01  -  29 Mar 2018 07:00  --------------------------------------------------------  IN:    multiple electrolytes Injection Type 1: 1925 mL    Solution: 275 mL  Total IN: 2200 mL    OUT:    Voided: 950 mL  Total OUT: 950 mL    Total NET: 1250 mL          LABS:                        10.9   6.5   )-----------( 253      ( 29 Mar 2018 06:57 )             34.1         139  |  102  |  6.0<L>  ----------------------------<  102  3.5   |  26.0  |  0.62    Ca    8.4<L>      29 Mar 2018 06:57  Phos  2.3       Mg     2.1         TPro  7.1  /  Alb  3.8  /  TBili  0.6  /  DBili  x   /  AST  26  /  ALT  25  /  AlkPhos  90        Urinalysis Basic - ( 28 Mar 2018 01:30 )    Color: Yellow / Appearance: Clear / S.015 / pH: x  Gluc: x / Ketone: Negative  / Bili: Negative / Urobili: Negative mg/dL   Blood: x / Protein: Negative mg/dL / Nitrite: Negative   Leuk Esterase: Negative / RBC: 0-2 /HPF / WBC Negative   Sq Epi: x / Non Sq Epi: Moderate / Bacteria: x

## 2018-03-30 ENCOUNTER — TRANSCRIPTION ENCOUNTER (OUTPATIENT)
Age: 53
End: 2018-03-30

## 2018-03-30 ENCOUNTER — RESULT REVIEW (OUTPATIENT)
Age: 53
End: 2018-03-30

## 2018-03-30 LAB — ABO RH CONFIRMATION: SIGNIFICANT CHANGE UP

## 2018-03-30 PROCEDURE — 71045 X-RAY EXAM CHEST 1 VIEW: CPT | Mod: 26

## 2018-03-30 PROCEDURE — 88304 TISSUE EXAM BY PATHOLOGIST: CPT | Mod: 26

## 2018-03-30 PROCEDURE — 93010 ELECTROCARDIOGRAM REPORT: CPT

## 2018-03-30 PROCEDURE — 47562 LAPAROSCOPIC CHOLECYSTECTOMY: CPT

## 2018-03-30 RX ORDER — ACETAMINOPHEN 500 MG
650 TABLET ORAL EVERY 6 HOURS
Qty: 0 | Refills: 0 | Status: DISCONTINUED | OUTPATIENT
Start: 2018-03-30 | End: 2018-03-31

## 2018-03-30 RX ORDER — ONDANSETRON 8 MG/1
4 TABLET, FILM COATED ORAL ONCE
Qty: 0 | Refills: 0 | Status: DISCONTINUED | OUTPATIENT
Start: 2018-03-30 | End: 2018-03-30

## 2018-03-30 RX ORDER — HYDROCHLOROTHIAZIDE 25 MG
12.5 TABLET ORAL DAILY
Qty: 0 | Refills: 0 | Status: DISCONTINUED | OUTPATIENT
Start: 2018-03-30 | End: 2018-03-31

## 2018-03-30 RX ORDER — ACETAMINOPHEN 500 MG
1000 TABLET ORAL ONCE
Qty: 0 | Refills: 0 | Status: COMPLETED | OUTPATIENT
Start: 2018-03-30 | End: 2018-03-30

## 2018-03-30 RX ORDER — GABAPENTIN 400 MG/1
300 CAPSULE ORAL THREE TIMES A DAY
Qty: 0 | Refills: 0 | Status: DISCONTINUED | OUTPATIENT
Start: 2018-03-30 | End: 2018-03-31

## 2018-03-30 RX ORDER — SODIUM CHLORIDE 9 MG/ML
1000 INJECTION, SOLUTION INTRAVENOUS
Qty: 0 | Refills: 0 | Status: DISCONTINUED | OUTPATIENT
Start: 2018-03-30 | End: 2018-03-31

## 2018-03-30 RX ORDER — FENTANYL CITRATE 50 UG/ML
50 INJECTION INTRAVENOUS
Qty: 0 | Refills: 0 | Status: DISCONTINUED | OUTPATIENT
Start: 2018-03-30 | End: 2018-03-30

## 2018-03-30 RX ORDER — ACETAMINOPHEN 500 MG
2 TABLET ORAL
Qty: 0 | Refills: 0 | COMMUNITY
Start: 2018-03-30

## 2018-03-30 RX ORDER — AMLODIPINE BESYLATE 2.5 MG/1
5 TABLET ORAL DAILY
Qty: 0 | Refills: 0 | Status: DISCONTINUED | OUTPATIENT
Start: 2018-03-30 | End: 2018-03-31

## 2018-03-30 RX ORDER — DOCUSATE SODIUM 100 MG
100 CAPSULE ORAL THREE TIMES A DAY
Qty: 0 | Refills: 0 | Status: DISCONTINUED | OUTPATIENT
Start: 2018-03-30 | End: 2018-03-31

## 2018-03-30 RX ORDER — LOSARTAN POTASSIUM 100 MG/1
100 TABLET, FILM COATED ORAL DAILY
Qty: 0 | Refills: 0 | Status: DISCONTINUED | OUTPATIENT
Start: 2018-03-30 | End: 2018-03-31

## 2018-03-30 RX ORDER — LOSARTAN POTASSIUM 100 MG/1
1 TABLET, FILM COATED ORAL
Qty: 0 | Refills: 0 | COMMUNITY
Start: 2018-03-30

## 2018-03-30 RX ORDER — ATORVASTATIN CALCIUM 80 MG/1
10 TABLET, FILM COATED ORAL AT BEDTIME
Qty: 0 | Refills: 0 | Status: DISCONTINUED | OUTPATIENT
Start: 2018-03-30 | End: 2018-03-31

## 2018-03-30 RX ORDER — ENOXAPARIN SODIUM 100 MG/ML
40 INJECTION SUBCUTANEOUS DAILY
Qty: 0 | Refills: 0 | Status: DISCONTINUED | OUTPATIENT
Start: 2018-03-31 | End: 2018-03-31

## 2018-03-30 RX ORDER — CYCLOBENZAPRINE HYDROCHLORIDE 10 MG/1
10 TABLET, FILM COATED ORAL THREE TIMES A DAY
Qty: 0 | Refills: 0 | Status: DISCONTINUED | OUTPATIENT
Start: 2018-03-30 | End: 2018-03-31

## 2018-03-30 RX ORDER — TRAMADOL HYDROCHLORIDE 50 MG/1
50 TABLET ORAL THREE TIMES A DAY
Qty: 0 | Refills: 0 | Status: DISCONTINUED | OUTPATIENT
Start: 2018-03-30 | End: 2018-03-31

## 2018-03-30 RX ORDER — LEVOTHYROXINE SODIUM 125 MCG
75 TABLET ORAL DAILY
Qty: 0 | Refills: 0 | Status: DISCONTINUED | OUTPATIENT
Start: 2018-03-30 | End: 2018-03-31

## 2018-03-30 RX ADMIN — AMLODIPINE BESYLATE 5 MILLIGRAM(S): 2.5 TABLET ORAL at 05:19

## 2018-03-30 RX ADMIN — GABAPENTIN 300 MILLIGRAM(S): 400 CAPSULE ORAL at 05:19

## 2018-03-30 RX ADMIN — Medication 100 MILLIGRAM(S): at 05:19

## 2018-03-30 RX ADMIN — Medication 1000 MILLIGRAM(S): at 22:33

## 2018-03-30 RX ADMIN — Medication 100 MILLIGRAM(S): at 22:03

## 2018-03-30 RX ADMIN — Medication 400 MILLIGRAM(S): at 22:03

## 2018-03-30 RX ADMIN — FENTANYL CITRATE 50 MICROGRAM(S): 50 INJECTION INTRAVENOUS at 15:34

## 2018-03-30 RX ADMIN — FENTANYL CITRATE 50 MICROGRAM(S): 50 INJECTION INTRAVENOUS at 15:50

## 2018-03-30 RX ADMIN — FENTANYL CITRATE 50 MICROGRAM(S): 50 INJECTION INTRAVENOUS at 15:41

## 2018-03-30 RX ADMIN — GABAPENTIN 300 MILLIGRAM(S): 400 CAPSULE ORAL at 22:03

## 2018-03-30 RX ADMIN — FENTANYL CITRATE 50 MICROGRAM(S): 50 INJECTION INTRAVENOUS at 17:31

## 2018-03-30 RX ADMIN — Medication 75 MICROGRAM(S): at 05:19

## 2018-03-30 RX ADMIN — ATORVASTATIN CALCIUM 10 MILLIGRAM(S): 80 TABLET, FILM COATED ORAL at 22:03

## 2018-03-30 RX ADMIN — FENTANYL CITRATE 50 MICROGRAM(S): 50 INJECTION INTRAVENOUS at 17:08

## 2018-03-30 RX ADMIN — FENTANYL CITRATE 50 MICROGRAM(S): 50 INJECTION INTRAVENOUS at 15:42

## 2018-03-30 RX ADMIN — PIPERACILLIN AND TAZOBACTAM 25 GRAM(S): 4; .5 INJECTION, POWDER, LYOPHILIZED, FOR SOLUTION INTRAVENOUS at 05:19

## 2018-03-30 RX ADMIN — FENTANYL CITRATE 50 MICROGRAM(S): 50 INJECTION INTRAVENOUS at 17:46

## 2018-03-30 RX ADMIN — TRAMADOL HYDROCHLORIDE 50 MILLIGRAM(S): 50 TABLET ORAL at 22:03

## 2018-03-30 RX ADMIN — TRAMADOL HYDROCHLORIDE 50 MILLIGRAM(S): 50 TABLET ORAL at 22:33

## 2018-03-30 NOTE — DISCHARGE NOTE ADULT - MEDICATION SUMMARY - MEDICATIONS TO TAKE
I will START or STAY ON the medications listed below when I get home from the hospital:    traMADol 50 mg oral tablet  -- 1 tab(s) by mouth every 4 hours  -- Indication: For Pain    acetaminophen 325 mg oral tablet  -- 2 tab(s) by mouth every 6 hours, As needed, Mild Pain (1 - 3)  -- Indication: For Pain    losartan 100 mg oral tablet  -- 1 tab(s) by mouth once a day  -- Indication: For HTN    gabapentin 300 mg oral capsule  -- 1 cap(s) by mouth 3 times a day  -- Indication: For RA (rheumatoid arthritis)    atorvastatin 10 mg oral tablet  -- 1 tab(s) by mouth once a day  -- Indication: For Hyperlipidemia    losartan-hydrochlorothiazide 100mg-12.5mg oral tablet  -- 1 tab(s) by mouth once a day  -- Indication: For HTN    amLODIPine 5 mg oral tablet  -- 1 tab(s) by mouth once a day  -- Indication: For HTN    hydroCHLOROthiazide 12.5 mg oral capsule  -- 1 cap(s) by mouth once a day  -- Indication: For HTN    cyclobenzaprine 10 mg oral tablet  -- 1 tab(s) by mouth 3 times a day  -- Indication: For RA (rheumatoid arthritis)    CoQ10  -- 200 milligram(s) by mouth once a day  -- Indication: For RA (rheumatoid arthritis)    Synthroid 75 mcg (0.075 mg) oral tablet  -- 1 tab(s) by mouth once a day  -- Indication: For Hypothyroidism

## 2018-03-30 NOTE — BRIEF OPERATIVE NOTE - PROCEDURE
<<-----Click on this checkbox to enter Procedure Laparoscopic cholecystectomy  03/30/2018    Active  SAGE

## 2018-03-30 NOTE — DISCHARGE NOTE ADULT - CARE PROVIDER_API CALL
[facility],   [address], first [place]   [place], [place], [address]  Phone: [phone]  Fax: (   )    -

## 2018-03-30 NOTE — DISCHARGE NOTE ADULT - PATIENT PORTAL LINK FT
You can access the [facility] Patient Portal, offered by [facility], by registering with the following website: [email or web address]

## 2018-03-30 NOTE — DISCHARGE NOTE ADULT - PROVIDER TOKENS
FREE:[LAST:[[facility]],PHONE:[phone]],FAX:[(   )    -],ADDRESS:[[address] [place] [address], [place], [place], [address]]]

## 2018-03-30 NOTE — DISCHARGE NOTE ADULT - PLAN OF CARE
Back to baseline level of activity Resume normal activities; avoid heavy lifting >10 lbs for 2 weeks Resume normal activities; avoid heavy lifting >10 lbs for 2 weeks  WOUND CARE: Do not peel off steri strips, they will fall off on their own.    BATHING: Please do not submerge wound underwater. You may shower and/or sponge bathe.    ACTIVITY: No heavy lifting or straining. Otherwise, you may return to your usual level of physical activity. If you are taking narcotic pain medication (such as tramadol) DO NOT drive a car, operate machinery or make important decisions.  DIET: Return to a low-fat diet.   NOTIFY YOUR SURGEON IF: You have any bleeding that does not stop, any pus draining from your wound(s), any fever (over 100.4 F) or chills, persistent nausea/vomiting, persistent diarrhea, chest pain, shortness of breath, or if your pain is not controlled on your discharge pain medications.  FOLLOW-UP: Please follow up with your primary care physician and Acute Care Surgery clinic [phone] in one week regarding your hospitalization. Call for appointment upon discharge.     For pain, you may continue to take your home dose of tramadol, as well as tylenol and/or motrin for mild-moderate pain.

## 2018-03-30 NOTE — PROGRESS NOTE ADULT - ASSESSMENT
53yo female s/p laparoscopic cholecystectomy, POD 0  -hemodynamically stable    Plan:  Pain control PRN  IV Fluids  Diet: Regular  Continue home medications  Monitor Vitals  Encourage ambulation and incentive spirometry

## 2018-03-30 NOTE — PROGRESS NOTE ADULT - SUBJECTIVE AND OBJECTIVE BOX
INTERVAL HPI/OVERNIGHT EVENTS:  Patient was seen and examined at bedside. No acute events postoperatively Pt reports some incisional site pain. Denies shortness of breath, nausea, vomiting, fever, and chills.  Is tolerating diet. Has voided twice postopertively. Using incentive spironmetry -1000.    STATUS POST:  Laparoscopic Cholecystectomy    POST OPERATIVE DAY #: 0      MEDICATIONS  (STANDING):  acetaminophen  IVPB. 1000 milliGRAM(s) IV Intermittent once  amLODIPine   Tablet 5 milliGRAM(s) Oral daily  atorvastatin 10 milliGRAM(s) Oral at bedtime  docusate sodium 100 milliGRAM(s) Oral three times a day  gabapentin 300 milliGRAM(s) Oral three times a day  hydrochlorothiazide 12.5 milliGRAM(s) Oral daily  lactated ringers. 1000 milliLiter(s) (100 mL/Hr) IV Continuous <Continuous>  levothyroxine 75 MICROGram(s) Oral daily  losartan 100 milliGRAM(s) Oral daily    MEDICATIONS  (PRN):  acetaminophen   Tablet. 650 milliGRAM(s) Oral every 6 hours PRN Mild Pain (1 - 3)  cyclobenzaprine 10 milliGRAM(s) Oral three times a day PRN Muscle Spasm  traMADol 50 milliGRAM(s) Oral three times a day PRN Mild Pain (1 - 3)      Vital Signs Last 24 Hrs  T(C): 36.4 (30 Mar 2018 19:47), Max: 37 (29 Mar 2018 23:36)  T(F): 97.6 (30 Mar 2018 19:47), Max: 98.6 (29 Mar 2018 23:36)  HR: 96 (30 Mar 2018 19:47) (68 - 100)  BP: 108/77 (30 Mar 2018 19:47) (96/61 - 128/86)  BP(mean): --  RR: 18 (30 Mar 2018 19:47) (10 - 20)  SpO2: 99% (30 Mar 2018 19:47) (95% - 100%)    Physical Exam:  Gen: NAD  Neurological:  No sensory/motor deficits  HEENT: PERRLA, EOMI  Respiratory: Breath Sounds equal & CTA bilaterally, no accessory muscle use  Cardiovascular: Regular rate & rhythm, normal S1, S2; no murmurs, gallops or rubs  Gastrointestinal: Soft, non-tender, nondistended. Incision sites are clean dry and intact.   Vascular: Equal and normal pulses: 2+ peripheral pulses throughout  Musculoskeletal: No joint pain, swelling or deformity; no limitation of movement  Skin: No rashes      I&O's Detail    29 Mar 2018 07:01  -  30 Mar 2018 07:00  --------------------------------------------------------  IN:    multiple electrolytes Injection Type 1multiple electrolytes Injection Type 1: 4050 mL    Oral Fluid: 400 mL    Solution: 175 mL  Total IN: 4625 mL    OUT:    Voided: 1600 mL  Total OUT: 1600 mL    Total NET: 3025 mL      30 Mar 2018 07:01  -  30 Mar 2018 21:33  --------------------------------------------------------  IN:    lactated ringers.: 300 mL  Total IN: 300 mL    OUT:    Voided: 500 mL    Voided: 550 mL  Total OUT: 1050 mL    Total NET: -750 mL          LABS:                        11.7   6.9   )-----------( 249      ( 29 Mar 2018 21:50 )             35.9     03-29    141  |  101  |  7.0<L>  ----------------------------<  91  3.7   |  24.0  |  0.61    Ca    8.7      29 Mar 2018 21:50  Phos  3.4     03-29  Mg     2.1     03-29      PT/INR - ( 29 Mar 2018 21:50 )   PT: 12.6 sec;   INR: 1.14 ratio         PTT - ( 29 Mar 2018 21:50 )  PTT:35.4 sec      RADIOLOGY & ADDITIONAL STUDIES:

## 2018-03-30 NOTE — DISCHARGE NOTE ADULT - HOSPITAL COURSE
51 yo F admitted on 3/28/2018 for abdominal pain. CT showed small bowel edema and gallstones; with equivocal abdominal U/S results. U/S repeated the next day showing a development of acute cholecystitis  Pt had laparoscopic appendectomy, uncomplicated on 3/30; Dispo home planning 51 yo F admitted on 3/28/2018 for abdominal pain. CT showed small bowel edema and gallstones; with equivocal abdominal U/S results. U/S repeated the next day showing a development of acute cholecystitis  Pt had laparoscopic cholecystectomy, uncomplicated on 3/30; Dispo home planning  Rest of hospital course uneventful, diet advanced and she tolerated, passing flatus, voiding and ambulating independently. Remains hemodynamically well and stable, pain is well controlled on PO pain medication .   As per attending, the patient is safe for discharge home.   Appropriate medications were sent to pharmacy.

## 2018-03-30 NOTE — DISCHARGE NOTE ADULT - CARE PLAN
Goal:	Back to baseline level of activity  Assessment and plan of treatment:	Resume normal activities; avoid heavy lifting >10 lbs for 2 weeks Principal Discharge DX:	Acute cholecystitis  Goal:	Back to baseline level of activity  Assessment and plan of treatment:	Resume normal activities; avoid heavy lifting >10 lbs for 2 weeks  WOUND CARE: Do not peel off steri strips, they will fall off on their own.    BATHING: Please do not submerge wound underwater. You may shower and/or sponge bathe.    ACTIVITY: No heavy lifting or straining. Otherwise, you may return to your usual level of physical activity. If you are taking narcotic pain medication (such as tramadol) DO NOT drive a car, operate machinery or make important decisions.  DIET: Return to a low-fat diet.   NOTIFY YOUR SURGEON IF: You have any bleeding that does not stop, any pus draining from your wound(s), any fever (over 100.4 F) or chills, persistent nausea/vomiting, persistent diarrhea, chest pain, shortness of breath, or if your pain is not controlled on your discharge pain medications.  FOLLOW-UP: Please follow up with your primary care physician and Acute Care Surgery clinic [phone] in one week regarding your hospitalization. Call for appointment upon discharge.     For pain, you may continue to take your home dose of tramadol, as well as tylenol and/or motrin for mild-moderate pain.

## 2018-03-30 NOTE — DISCHARGE NOTE ADULT - ADDITIONAL INSTRUCTIONS
Call [phone]  [facility] Surgery clinic, to make an appointment for 2 weeks from now

## 2018-03-30 NOTE — PRE-OP CHECKLIST - SELECT TESTS ORDERED
CBC/CMP/PT/PTT/INR/Hepatic Function/Type and Screen CBC/INR/Type and Screen/CMP/Hepatic Function/PT/PTT/CXR CBC/INR/BMP/CMP/PT/PTT/Hepatic Function/CXR/EKG/Type and Screen

## 2018-03-31 VITALS
DIASTOLIC BLOOD PRESSURE: 88 MMHG | SYSTOLIC BLOOD PRESSURE: 114 MMHG | HEART RATE: 72 BPM | RESPIRATION RATE: 18 BRPM | OXYGEN SATURATION: 96 % | TEMPERATURE: 98 F

## 2018-03-31 PROCEDURE — 88304 TISSUE EXAM BY PATHOLOGIST: CPT

## 2018-03-31 PROCEDURE — 96376 TX/PRO/DX INJ SAME DRUG ADON: CPT

## 2018-03-31 PROCEDURE — 80048 BASIC METABOLIC PNL TOTAL CA: CPT

## 2018-03-31 PROCEDURE — 86850 RBC ANTIBODY SCREEN: CPT

## 2018-03-31 PROCEDURE — 86900 BLOOD TYPING SEROLOGIC ABO: CPT

## 2018-03-31 PROCEDURE — 85014 HEMATOCRIT: CPT

## 2018-03-31 PROCEDURE — 36600 WITHDRAWAL OF ARTERIAL BLOOD: CPT

## 2018-03-31 PROCEDURE — 85610 PROTHROMBIN TIME: CPT

## 2018-03-31 PROCEDURE — 99285 EMERGENCY DEPT VISIT HI MDM: CPT | Mod: 25

## 2018-03-31 PROCEDURE — 84295 ASSAY OF SERUM SODIUM: CPT

## 2018-03-31 PROCEDURE — 82803 BLOOD GASES ANY COMBINATION: CPT

## 2018-03-31 PROCEDURE — 82435 ASSAY OF BLOOD CHLORIDE: CPT

## 2018-03-31 PROCEDURE — 93005 ELECTROCARDIOGRAM TRACING: CPT

## 2018-03-31 PROCEDURE — 81001 URINALYSIS AUTO W/SCOPE: CPT

## 2018-03-31 PROCEDURE — 83690 ASSAY OF LIPASE: CPT

## 2018-03-31 PROCEDURE — 82542 COL CHROMOTOGRAPHY QUAL/QUAN: CPT

## 2018-03-31 PROCEDURE — 96374 THER/PROPH/DIAG INJ IV PUSH: CPT

## 2018-03-31 PROCEDURE — 80053 COMPREHEN METABOLIC PANEL: CPT

## 2018-03-31 PROCEDURE — 84100 ASSAY OF PHOSPHORUS: CPT

## 2018-03-31 PROCEDURE — 86901 BLOOD TYPING SEROLOGIC RH(D): CPT

## 2018-03-31 PROCEDURE — 85027 COMPLETE CBC AUTOMATED: CPT

## 2018-03-31 PROCEDURE — 71045 X-RAY EXAM CHEST 1 VIEW: CPT

## 2018-03-31 PROCEDURE — 76705 ECHO EXAM OF ABDOMEN: CPT

## 2018-03-31 PROCEDURE — 76700 US EXAM ABDOM COMPLETE: CPT

## 2018-03-31 PROCEDURE — G0378: CPT

## 2018-03-31 PROCEDURE — 84132 ASSAY OF SERUM POTASSIUM: CPT

## 2018-03-31 PROCEDURE — 83735 ASSAY OF MAGNESIUM: CPT

## 2018-03-31 PROCEDURE — 74177 CT ABD & PELVIS W/CONTRAST: CPT

## 2018-03-31 PROCEDURE — 36415 COLL VENOUS BLD VENIPUNCTURE: CPT

## 2018-03-31 PROCEDURE — 83605 ASSAY OF LACTIC ACID: CPT

## 2018-03-31 PROCEDURE — 87040 BLOOD CULTURE FOR BACTERIA: CPT

## 2018-03-31 PROCEDURE — 96375 TX/PRO/DX INJ NEW DRUG ADDON: CPT

## 2018-03-31 PROCEDURE — 82947 ASSAY GLUCOSE BLOOD QUANT: CPT

## 2018-03-31 PROCEDURE — 85730 THROMBOPLASTIN TIME PARTIAL: CPT

## 2018-03-31 PROCEDURE — 82330 ASSAY OF CALCIUM: CPT

## 2018-03-31 RX ADMIN — GABAPENTIN 300 MILLIGRAM(S): 400 CAPSULE ORAL at 13:38

## 2018-03-31 RX ADMIN — Medication 100 MILLIGRAM(S): at 13:38

## 2018-03-31 RX ADMIN — TRAMADOL HYDROCHLORIDE 50 MILLIGRAM(S): 50 TABLET ORAL at 07:01

## 2018-03-31 RX ADMIN — ENOXAPARIN SODIUM 40 MILLIGRAM(S): 100 INJECTION SUBCUTANEOUS at 12:02

## 2018-03-31 RX ADMIN — CYCLOBENZAPRINE HYDROCHLORIDE 10 MILLIGRAM(S): 10 TABLET, FILM COATED ORAL at 12:02

## 2018-03-31 RX ADMIN — LOSARTAN POTASSIUM 100 MILLIGRAM(S): 100 TABLET, FILM COATED ORAL at 06:30

## 2018-03-31 RX ADMIN — TRAMADOL HYDROCHLORIDE 50 MILLIGRAM(S): 50 TABLET ORAL at 06:31

## 2018-03-31 RX ADMIN — AMLODIPINE BESYLATE 5 MILLIGRAM(S): 2.5 TABLET ORAL at 06:30

## 2018-03-31 RX ADMIN — Medication 100 MILLIGRAM(S): at 06:30

## 2018-03-31 RX ADMIN — Medication 75 MICROGRAM(S): at 06:31

## 2018-03-31 RX ADMIN — GABAPENTIN 300 MILLIGRAM(S): 400 CAPSULE ORAL at 06:30

## 2018-03-31 RX ADMIN — Medication 12.5 MILLIGRAM(S): at 06:31

## 2018-04-02 LAB
CULTURE RESULTS: SIGNIFICANT CHANGE UP
SPECIMEN SOURCE: SIGNIFICANT CHANGE UP

## 2018-04-03 PROBLEM — Z78.9 OTHER SPECIFIED HEALTH STATUS: Chronic | Status: ACTIVE | Noted: 2018-03-28

## 2018-04-03 PROBLEM — M06.9 RHEUMATOID ARTHRITIS, UNSPECIFIED: Chronic | Status: ACTIVE | Noted: 2018-03-28

## 2018-04-03 LAB — UBIQUINONE10 SERPL-MCNC: 2 MG/L — HIGH (ref 0.44–1.64)

## 2018-04-04 LAB
CULTURE RESULTS: SIGNIFICANT CHANGE UP
SPECIMEN SOURCE: SIGNIFICANT CHANGE UP
SURGICAL PATHOLOGY FINAL REPORT - CH: SIGNIFICANT CHANGE UP

## 2018-04-12 ENCOUNTER — APPOINTMENT (OUTPATIENT)
Dept: TRAUMA SURGERY | Facility: CLINIC | Age: 53
End: 2018-04-12
Payer: COMMERCIAL

## 2018-04-12 VITALS
HEIGHT: 66 IN | HEART RATE: 102 BPM | SYSTOLIC BLOOD PRESSURE: 106 MMHG | BODY MASS INDEX: 24.43 KG/M2 | WEIGHT: 152 LBS | OXYGEN SATURATION: 100 % | DIASTOLIC BLOOD PRESSURE: 74 MMHG | TEMPERATURE: 97.7 F

## 2018-04-12 DIAGNOSIS — Z90.49 ACQUIRED ABSENCE OF OTHER SPECIFIED PARTS OF DIGESTIVE TRACT: ICD-10-CM

## 2018-04-12 DIAGNOSIS — Z83.2 FAMILY HISTORY OF DISEASES OF THE BLOOD AND BLOOD-FORMING ORGANS AND CERTAIN DISORDERS INVOLVING THE IMMUNE MECHANISM: ICD-10-CM

## 2018-04-12 DIAGNOSIS — Z83.49 FAMILY HISTORY OF OTHER ENDOCRINE, NUTRITIONAL AND METABOLIC DISEASES: ICD-10-CM

## 2018-04-12 DIAGNOSIS — Z82.49 FAMILY HISTORY OF ISCHEMIC HEART DISEASE AND OTHER DISEASES OF THE CIRCULATORY SYSTEM: ICD-10-CM

## 2018-04-12 DIAGNOSIS — Z83.3 FAMILY HISTORY OF DIABETES MELLITUS: ICD-10-CM

## 2018-04-12 PROCEDURE — 99024 POSTOP FOLLOW-UP VISIT: CPT

## 2018-04-16 PROBLEM — Z83.2 FAMILY HISTORY OF ANEMIA: Status: ACTIVE | Noted: 2018-04-12

## 2018-04-16 PROBLEM — Z83.49 FAMILY HISTORY OF THYROID DISEASE: Status: ACTIVE | Noted: 2018-04-12

## 2018-04-16 PROBLEM — Z83.3 FAMILY HISTORY OF DIABETES MELLITUS: Status: ACTIVE | Noted: 2018-04-12

## 2018-04-16 PROBLEM — Z90.49 STATUS POST LAPAROSCOPIC CHOLECYSTECTOMY: Status: ACTIVE | Noted: 2018-04-16

## 2018-04-16 PROBLEM — Z82.49 FAMILY HISTORY OF HYPERTENSION: Status: ACTIVE | Noted: 2018-04-12

## 2019-02-11 ENCOUNTER — APPOINTMENT (OUTPATIENT)
Dept: GASTROENTEROLOGY | Facility: CLINIC | Age: 54
End: 2019-02-11
Payer: MEDICARE

## 2019-02-11 VITALS
HEART RATE: 104 BPM | DIASTOLIC BLOOD PRESSURE: 99 MMHG | SYSTOLIC BLOOD PRESSURE: 130 MMHG | OXYGEN SATURATION: 100 % | HEIGHT: 66 IN | BODY MASS INDEX: 23.95 KG/M2 | RESPIRATION RATE: 16 BRPM | WEIGHT: 149 LBS

## 2019-02-11 PROCEDURE — 99204 OFFICE O/P NEW MOD 45 MIN: CPT

## 2019-02-11 RX ORDER — TRAMADOL HYDROCHLORIDE 50 MG/1
50 TABLET, COATED ORAL
Refills: 0 | Status: ACTIVE | COMMUNITY

## 2019-02-11 NOTE — PHYSICAL EXAM
[General Appearance - Alert] : alert [General Appearance - In No Acute Distress] : in no acute distress [Sclera] : the sclera and conjunctiva were normal [PERRL With Normal Accommodation] : pupils were equal in size, round, and reactive to light [Extraocular Movements] : extraocular movements were intact [Outer Ear] : the ears and nose were normal in appearance [Oropharynx] : the oropharynx was normal [Neck Appearance] : the appearance of the neck was normal [Neck Cervical Mass (___cm)] : no neck mass was observed [Jugular Venous Distention Increased] : there was no jugular-venous distention [Thyroid Diffuse Enlargement] : the thyroid was not enlarged [Thyroid Nodule] : there were no palpable thyroid nodules [Auscultation Breath Sounds / Voice Sounds] : lungs were clear to auscultation bilaterally [Heart Rate And Rhythm] : heart rate was normal and rhythm regular [Heart Sounds] : normal S1 and S2 [Heart Sounds Gallop] : no gallops [Murmurs] : no murmurs [Heart Sounds Pericardial Friction Rub] : no pericardial rub [Bowel Sounds] : normal bowel sounds [Abdomen Soft] : soft [Abdomen Tenderness] : non-tender [Abdomen Mass (___ Cm)] : no abdominal mass palpated [Cervical Lymph Nodes Enlarged Posterior Bilaterally] : posterior cervical [Cervical Lymph Nodes Enlarged Anterior Bilaterally] : anterior cervical [Supraclavicular Lymph Nodes Enlarged Bilaterally] : supraclavicular [No CVA Tenderness] : no ~M costovertebral angle tenderness [Skin Color & Pigmentation] : normal skin color and pigmentation [Skin Turgor] : normal skin turgor [] : no rash [Oriented To Time, Place, And Person] : oriented to person, place, and time [Impaired Insight] : insight and judgment were intact [Affect] : the affect was normal [Mood] : the mood was normal

## 2019-02-12 NOTE — ASSESSMENT
[FreeTextEntry1] : This is a 53-year-old female with a history of rheumatoid arthritis who is currently being treated with rituximab she is known to be hepatitis B surface antigen negative hepatitis B core antibody positive. Currently her liver tests are normal and she is hard he received 4 doses of rituximab. I will send a hepatitis B viral DNA PCR. I discussed the concern with reactivation the fact that she is moderate risk for reactivation given the fact that she is being treated with rituximab. I would strongly encourage a prophylactic treatment and I've discussed the risks benefits of prophylactic treatment with the patient and she is electing not to start at this time would like to think about it.

## 2019-02-12 NOTE — HISTORY OF PRESENT ILLNESS
[de-identified] : This is a 53-year-old female with a history of rheumatoid arthritis who is currently being treated with rituximab. She had a viral hepatitis panel sent by her rheumatologist and is known to be hepatitis B surface antigen negative but hepatitis B core antibody positive. Her liver function tests have been normal on the most recent lab work but she has had elevated liver function tests in the past and this was attributed to methotrexate. She is currently off of methotrexate. She has received four doses of Rituxan already and liver function tests are unremarkable. She was referred by her rheumatologist given her history of hepatitis B core antibody positivity and the need to continue rituximab and the concern for reactive his reactivation.

## 2019-02-12 NOTE — CONSULT LETTER
[Dear  ___] : Dear  [unfilled], [Consult Letter:] : I had the pleasure of evaluating your patient, [unfilled]. [Please see my note below.] : Please see my note below. [Sincerely,] : Sincerely, [FreeTextEntry3] : Jemal Christian M.D.

## 2019-02-15 LAB
ALP BLD-CCNC: 109 U/L
ALT SERPL-CCNC: 18 U/L
AST SERPL-CCNC: 16 U/L
BILIRUB SERPL-MCNC: 0.4 MG/DL
HBV DNA # SERPL NAA+PROBE: NOT DETECTED IU/ML
HBV E AB SER QL: NEGATIVE
HBV E AG SER QL: NEGATIVE
HBV SURFACE AG SER QL: NONREACTIVE
HEPB DNA PCR LOG: NOT DETECTED LOGIU/ML

## 2019-03-18 ENCOUNTER — RX RENEWAL (OUTPATIENT)
Age: 54
End: 2019-03-18

## 2019-04-01 NOTE — BRIEF OPERATIVE NOTE - CONDITION POST OP
Pt states that since she was sent to labor and delivery 3/22 she continues to have pressure and back hurting on and off. She at times gets dizzy and lightheaded and she trys to make sure she is drinking. She doesn't want to go back to labor and delivery as she was told she was fine she states but doesn't know why she has these feelings on and off. She will talk to her SO. I offered her appointments for tomorrow but she wants an appointment at [phone] or Friday. RN states she should go to Labor and delivery off hours and to call the same phone number to let them know she will be coming in. RN states even if she thinks she is fine but having those pains she should be seen and assessed. Pt is 25 plus weeks pg   Extubated, good

## 2019-08-19 ENCOUNTER — RESULT REVIEW (OUTPATIENT)
Age: 54
End: 2019-08-19

## 2020-02-18 NOTE — PROGRESS NOTE ADULT - I WAS PHYSICALLY PRESENT FOR THE KEY PORTIONS OF THE EVALUATION AND MANAGEMENT (E/M) SERVICE PROVIDED.  I AGREE WITH THE ABOVE HISTORY, PHYSICAL, AND PLAN WHICH I HAVE REVIEWED AND EDITED WHERE APPROPRIATE
No answer from the parent of Idalia. I left a vm saying she can go to school tomorrow, I have asked Rachel Gonsalez.          ROSANNE Santoro  
Statement Selected

## 2020-07-17 ENCOUNTER — APPOINTMENT (OUTPATIENT)
Dept: CARDIOLOGY | Facility: CLINIC | Age: 55
End: 2020-07-17
Payer: MEDICARE

## 2020-07-17 ENCOUNTER — NON-APPOINTMENT (OUTPATIENT)
Age: 55
End: 2020-07-17

## 2020-07-17 VITALS
WEIGHT: 160 LBS | HEART RATE: 97 BPM | DIASTOLIC BLOOD PRESSURE: 85 MMHG | SYSTOLIC BLOOD PRESSURE: 122 MMHG | HEIGHT: 66 IN | TEMPERATURE: 97.8 F | BODY MASS INDEX: 25.71 KG/M2 | OXYGEN SATURATION: 100 %

## 2020-07-17 VITALS — DIASTOLIC BLOOD PRESSURE: 82 MMHG | SYSTOLIC BLOOD PRESSURE: 115 MMHG

## 2020-07-17 DIAGNOSIS — Z87.898 PERSONAL HISTORY OF OTHER SPECIFIED CONDITIONS: ICD-10-CM

## 2020-07-17 DIAGNOSIS — R55 SYNCOPE AND COLLAPSE: ICD-10-CM

## 2020-07-17 PROCEDURE — 99205 OFFICE O/P NEW HI 60 MIN: CPT

## 2020-07-17 PROCEDURE — 93000 ELECTROCARDIOGRAM COMPLETE: CPT

## 2020-07-17 RX ORDER — UBIDECARENONE/VIT E ACET 100MG-5
CAPSULE ORAL
Refills: 0 | Status: ACTIVE | COMMUNITY

## 2020-07-17 RX ORDER — CHOLECALCIFEROL (VITAMIN D3) 50 MCG
2000 CAPSULE ORAL
Refills: 0 | Status: ACTIVE | COMMUNITY

## 2020-07-17 RX ORDER — ENTECAVIR 0.5 MG/1
0.5 TABLET, FILM COATED ORAL DAILY
Qty: 30 | Refills: 0 | Status: DISCONTINUED | COMMUNITY
Start: 2019-02-15 | End: 2020-07-17

## 2020-07-17 RX ORDER — AMLODIPINE BESYLATE 5 MG/1
TABLET ORAL
Refills: 0 | Status: DISCONTINUED | COMMUNITY
End: 2020-07-17

## 2020-07-17 NOTE — PHYSICAL EXAM
[General Appearance - Well Developed] : well developed [Normal Appearance] : normal appearance [General Appearance - Well Nourished] : well nourished [Well Groomed] : well groomed [Normal Conjunctiva] : the conjunctiva exhibited no abnormalities [No Deformities] : no deformities [General Appearance - In No Acute Distress] : no acute distress [Normal Oral Mucosa] : normal oral mucosa [Eyelids - No Xanthelasma] : the eyelids demonstrated no xanthelasmas [No Oral Cyanosis] : no oral cyanosis [No Oral Pallor] : no oral pallor [No Jugular Venous Batista A Waves] : no jugular venous batista A waves [Normal Jugular Venous V Waves Present] : normal jugular venous V waves present [Normal Jugular Venous A Waves Present] : normal jugular venous A waves present [Heart Rate And Rhythm] : heart rate and rhythm were normal [Heart Sounds] : normal S1 and S2 [Murmurs] : no murmurs present [Respiration, Rhythm And Depth] : normal respiratory rhythm and effort [Exaggerated Use Of Accessory Muscles For Inspiration] : no accessory muscle use [Auscultation Breath Sounds / Voice Sounds] : lungs were clear to auscultation bilaterally [Abdomen Tenderness] : non-tender [Abdomen Soft] : soft [Abdomen Mass (___ Cm)] : no abdominal mass palpated [Abnormal Walk] : normal gait [Gait - Sufficient For Exercise Testing] : the gait was sufficient for exercise testing [Nail Clubbing] : no clubbing of the fingernails [Petechial Hemorrhages (___cm)] : no petechial hemorrhages [Cyanosis, Localized] : no localized cyanosis [Skin Color & Pigmentation] : normal skin color and pigmentation [Skin Lesions] : no skin lesions [No Venous Stasis] : no venous stasis [] : no rash [No Skin Ulcers] : no skin ulcer [No Xanthoma] : no  xanthoma was observed [Affect] : the affect was normal [Mood] : the mood was normal [Oriented To Time, Place, And Person] : oriented to person, place, and time [No Anxiety] : not feeling anxious

## 2020-07-17 NOTE — REASON FOR VISIT
[Initial Evaluation] : an initial evaluation of [FreeTextEntry2] : abnormal EKG [FreeTextEntry1] : abnormal EKG

## 2020-07-17 NOTE — HISTORY OF PRESENT ILLNESS
[FreeTextEntry1] : abnormal EKG\par \par This is a 54 year old woman with history of dyslipidemia and hypertension , pre diabetes,  Rheumatoid arthritis, here for ECG abnormalities. \par shortness of breathe .  on exrertion. whern she does heavy exertion. last year. not getting worse. \par no chest pain. \par at night she gets palpitations.  she takes deep breath it slows down. \par  syncope: 2009.  at her [facility]. \par second time she did when she was getting epidural injection in her neck 3 years ago.

## 2020-07-17 NOTE — DISCUSSION/SUMMARY
[Risks] : risks [Patient] : the patient [Benefits] : benefits [Alternatives] : alternatives [___ Month(s)] : [unfilled] month(s) [With Me] : with me [FreeTextEntry1] : This is a 54 year old woman with dyslipidemia and hypertension and pre diabetes, Rheumatoid arthritis, , here for abnormal EKG and Sinus tachy\par \par 1) Abnormal KEG: and dyspnea: intermediate risk factors for coronary artery disease. stress  echocardiogram with contrast if needed.  2d echo.  mag check.\par 2) sinus tachy: 48 hr HOLTER.  tsh results form pcp. evaluat efor inappriooparite sinus tahcy. dhydration. \par 3) syncope: vasovagal. carotid US and holter monitor. \par

## 2020-08-13 ENCOUNTER — APPOINTMENT (OUTPATIENT)
Dept: CARDIOLOGY | Facility: CLINIC | Age: 55
End: 2020-08-13
Payer: MEDICARE

## 2020-08-13 PROCEDURE — 93880 EXTRACRANIAL BILAT STUDY: CPT

## 2020-08-13 PROCEDURE — 93306 TTE W/DOPPLER COMPLETE: CPT

## 2020-10-23 NOTE — H&P ADULT - I WAS PHYSICALLY PRESENT FOR THE KEY PORTIONS OF THE EVALUATION AND MANAGEMENT (E/M) SERVICE PROVIDED.  I AGREE WITH THE ABOVE HISTORY, PHYSICAL, AND PLAN WHICH I HAVE REVIEWED AND EDITED WHERE APPROPRIATE
upon d/c patient bp elevated, er MD aware. interpretor used [id number]. patient took own bp medications prior to d/c. ambulated out of ed. no other needs at this time.
Statement Selected

## 2021-01-20 ENCOUNTER — APPOINTMENT (OUTPATIENT)
Dept: CARDIOLOGY | Facility: CLINIC | Age: 56
End: 2021-01-20

## 2021-01-27 ENCOUNTER — APPOINTMENT (OUTPATIENT)
Dept: RHEUMATOLOGY | Facility: CLINIC | Age: 56
End: 2021-01-27
Payer: MEDICARE

## 2021-01-27 ENCOUNTER — LABORATORY RESULT (OUTPATIENT)
Age: 56
End: 2021-01-27

## 2021-01-27 VITALS
OXYGEN SATURATION: 99 % | BODY MASS INDEX: 26.84 KG/M2 | HEART RATE: 93 BPM | TEMPERATURE: 97.4 F | SYSTOLIC BLOOD PRESSURE: 122 MMHG | RESPIRATION RATE: 17 BRPM | WEIGHT: 167 LBS | HEIGHT: 66 IN | DIASTOLIC BLOOD PRESSURE: 80 MMHG

## 2021-01-27 PROCEDURE — 99072 ADDL SUPL MATRL&STAF TM PHE: CPT

## 2021-01-27 PROCEDURE — 99203 OFFICE O/P NEW LOW 30 MIN: CPT

## 2021-01-27 NOTE — PHYSICAL EXAM
Taking weekly Vit D - will check Vit D level   [General Appearance - Alert] : alert [General Appearance - In No Acute Distress] : in no acute distress [General Appearance - Well Nourished] : well nourished [General Appearance - Well Developed] : well developed [Sclera] : the sclera and conjunctiva were normal [PERRL With Normal Accommodation] : pupils were equal in size, round, and reactive to light [Extraocular Movements] : extraocular movements were intact [Outer Ear] : the ears and nose were normal in appearance [Neck Appearance] : the appearance of the neck was normal [Jugular Venous Distention Increased] : there was no jugular-venous distention [Auscultation Breath Sounds / Voice Sounds] : lungs were clear to auscultation bilaterally [Heart Rate And Rhythm] : heart rate was normal and rhythm regular [Heart Sounds] : normal S1 and S2 [Heart Sounds Gallop] : no gallops [Murmurs] : no murmurs [Heart Sounds Pericardial Friction Rub] : no pericardial rub [Bowel Sounds] : normal bowel sounds [Abdomen Soft] : soft [Abdomen Tenderness] : non-tender [No CVA Tenderness] : no ~M costovertebral angle tenderness [No Spinal Tenderness] : no spinal tenderness [Abnormal Walk] : normal gait [Nail Clubbing] : no clubbing  or cyanosis of the fingernails [Involuntary Movements] : no involuntary movements were seen [Musculoskeletal - Swelling] : no joint swelling seen [Motor Tone] : muscle strength and tone were normal [] : no rash [Skin Lesions] : no skin lesions [Sensation] : the sensory exam was normal to light touch and pinprick [Motor Exam] : the motor exam was normal [No Focal Deficits] : no focal deficits [Oriented To Time, Place, And Person] : oriented to person, place, and time [Impaired Insight] : insight and judgment were intact [Affect] : the affect was normal [Mood] : the mood was normal [FreeTextEntry1] : +tenderness with mild synovitis to Right 2,3rd MCP

## 2021-01-27 NOTE — REVIEW OF SYSTEMS
ASHLEYJUAN MIGUEL CALDERON Male 82y [id number]    Patient is a 82y old  Male who presents with a chief complaint of SOB (26 Dec 2017 14:16)      Subjective/objective:  Pt seen and examined at bedside, no over night event reported by night staff. Pt was admitted yesterday for respiratory distress and being treated for possible PNA and pleural effusion of unknown etiology, r/o CHF. This morning during round patient reports feeling great, he is Khmer speaking, interviewed with . he denied any SOB at this time but reports productive cough with yellowish phlegm for last 5-6 days. He also complaints of urinary burning and pain with urination. he does not have idea about his chronic medical problems charts from NH reviewed and medication list verified and reconciled.     Review of system:  No fever, chills, nausea, vomiting, headache, dizziness, chest pain, palpitation.      PHYSICAL EXAM:    Vital Signs Last 24 Hrs  T(C): 36.6 (27 Dec 2017 09:03), Max: 36.6 (26 Dec 2017 23:29)  T(F): 97.9 (27 Dec 2017 09:03), Max: 97.9 (26 Dec 2017 23:29)  HR: 65 (27 Dec 2017 11:46) (64 - 72)  BP: 135/69 (27 Dec 2017 11:46) (121/54 - 143/69)  BP(mean): 87 (27 Dec 2017 11:46) (71 - 91)  RR: 18 (27 Dec 2017 08:00) (16 - 23)  SpO2: 98% (27 Dec 2017 08:00) (91% - 100%)    GENERAL: Pt lying comfortably, NAD.,   CHEST/LUNG: Clear to auscultation bilaterally; No wheezing.  HEART: S1S2+, Regular rate and rhythm; No murmurs.  ABDOMEN: Soft, Nontender, Nondistended; Bowel sounds present.  Extremities:  Lt TMA site clean, no sign of infection  NEURO: AAOX3, no focal deficits, no motor r sensory loss.  PSYCH: normal mood.          MEDICATIONS  (STANDING):  aspirin 325 milliGRAM(s) Oral daily  atorvastatin 40 milliGRAM(s) Oral at bedtime  dextrose 5%. 1000 milliLiter(s) (50 mL/Hr) IV Continuous <Continuous>  dextrose 50% Injectable 12.5 Gram(s) IV Push once  dextrose 50% Injectable 25 Gram(s) IV Push once  dextrose 50% Injectable 25 Gram(s) IV Push once  ertapenem  IVPB      ertapenem  IVPB 500 milliGRAM(s) IV Intermittent every 24 hours  furosemide   Injectable 40 milliGRAM(s) IV Push daily  heparin  Injectable 5000 Unit(s) SubCutaneous every 8 hours  metoprolol     tartrate 50 milliGRAM(s) Oral two times a day  pantoprazole    Tablet 40 milliGRAM(s) Oral before breakfast  tamsulosin 0.4 milliGRAM(s) Oral at bedtime    MEDICATIONS  (PRN):  dextrose Gel 1 Dose(s) Oral once PRN Blood Glucose LESS THAN 70 milliGRAM(s)/deciLiter  glucagon  Injectable 1 milliGRAM(s) IntraMuscular once PRN Glucose <70 milliGRAM(s)/deciLiter        Labs:  LABS:                        9.1    7.6   )-----------( 315      ( 27 Dec 2017 07:13 )             28.6     12    143  |  105  |  33.0<H>  ----------------------------<  94  4.1   |  23.0  |  2.40<H>    Ca    8.3<L>      27 Dec 2017 07:13    TPro  7.4  /  Alb  3.2<L>  /  TBili  0.2<L>  /  DBili  x   /  AST  22  /  ALT  16  /  AlkPhos  150<H>  12    PT/INR - ( 26 Dec 2017 08:36 )   PT: 13.7 sec;   INR: 1.24 ratio         PTT - ( 26 Dec 2017 08:36 )  PTT:30.1 sec    LIVER FUNCTIONS - ( 26 Dec 2017 07:33 )  Alb: 3.2 g/dL / Pro: 7.4 g/dL / ALK PHOS: 150 U/L / ALT: 16 U/L / AST: 22 U/L / GGT: x           Urinalysis Basic - ( 26 Dec 2017 09:33 )    Color: Yellow / Appearance: Clear / S.015 / pH: x  Gluc: x / Ketone: Negative  / Bili: Negative / Urobili: Negative mg/dL   Blood: x / Protein: 100 mg/dL / Nitrite: Negative   Leuk Esterase: Moderate / RBC: 0-2 /HPF / WBC 11-25   Sq Epi: x / Non Sq Epi: Few / Bacteria: Moderate        CARDIAC MARKERS ( 26 Dec 2017 13:50 )  x     / 0.04 ng/mL / x     / x     / x      CARDIAC MARKERS ( 26 Dec 2017 07:33 )  x     / 0.06 ng/mL / x     / x     / x ASHLEYJUAN MIGUEL CALDERON Male 82y [id number]    Patient is a 82y old  Male who presents with a chief complaint of SOB (26 Dec 2017 14:16)      Subjective/objective:  Pt seen and examined at bedside, no over night event reported by night staff. Pt was admitted yesterday for respiratory distress and being treated for possible PNA and pleural effusion of unknown etiology, r/o CHF. This morning during round patient reports feeling great, he is Yi speaking, interviewed with . he denied any SOB at this time but reports productive cough with yellowish phlegm for last 5-6 days. He also complaints of urinary burning and pain with urination. he does not have idea about his chronic medical problems or medication. Charts from NH reviewed and medication list verified and reconciled.     Review of system:  No fever, chills, nausea, vomiting, headache, dizziness, chest pain, palpitation.      PHYSICAL EXAM:    Vital Signs Last 24 Hrs  T(C): 36.6 (27 Dec 2017 09:03), Max: 36.6 (26 Dec 2017 23:29)  T(F): 97.9 (27 Dec 2017 09:03), Max: 97.9 (26 Dec 2017 23:29)  HR: 65 (27 Dec 2017 11:46) (64 - 72)  BP: 135/69 (27 Dec 2017 11:46) (121/54 - 143/69)  BP(mean): 87 (27 Dec 2017 11:46) (71 - 91)  RR: 18 (27 Dec 2017 08:00) (16 - 23)  SpO2: 98% (27 Dec 2017 08:00) (91% - 100%)    GENERAL: Pt lying comfortably, NAD.,   CHEST/LUNG: Clear to auscultation bilaterally; No wheezing.  HEART: S1S2+, Regular rate and rhythm; No murmurs.  ABDOMEN: Soft, Nontender, Nondistended; Bowel sounds present.  Extremities:  Lt TMA site clean, no sign of infection  NEURO: AAOX1, no focal deficits, no motor r sensory loss.  PSYCH: normal mood.          MEDICATIONS  (STANDING):  aspirin 325 milliGRAM(s) Oral daily  atorvastatin 40 milliGRAM(s) Oral at bedtime  dextrose 5%. 1000 milliLiter(s) (50 mL/Hr) IV Continuous <Continuous>  dextrose 50% Injectable 12.5 Gram(s) IV Push once  dextrose 50% Injectable 25 Gram(s) IV Push once  dextrose 50% Injectable 25 Gram(s) IV Push once  ertapenem  IVPB      ertapenem  IVPB 500 milliGRAM(s) IV Intermittent every 24 hours  furosemide   Injectable 40 milliGRAM(s) IV Push daily  heparin  Injectable 5000 Unit(s) SubCutaneous every 8 hours  metoprolol     tartrate 50 milliGRAM(s) Oral two times a day  pantoprazole    Tablet 40 milliGRAM(s) Oral before breakfast  tamsulosin 0.4 milliGRAM(s) Oral at bedtime    MEDICATIONS  (PRN):  dextrose Gel 1 Dose(s) Oral once PRN Blood Glucose LESS THAN 70 milliGRAM(s)/deciLiter  glucagon  Injectable 1 milliGRAM(s) IntraMuscular once PRN Glucose <70 milliGRAM(s)/deciLiter        Labs:  LABS:                        9.1    7.6   )-----------( 315      ( 27 Dec 2017 07:13 )             28.6     12    143  |  105  |  33.0<H>  ----------------------------<  94  4.1   |  23.0  |  2.40<H>    Ca    8.3<L>      27 Dec 2017 07:13    TPro  7.4  /  Alb  3.2<L>  /  TBili  0.2<L>  /  DBili  x   /  AST  22  /  ALT  16  /  AlkPhos  150<H>  12    PT/INR - ( 26 Dec 2017 08:36 )   PT: 13.7 sec;   INR: 1.24 ratio         PTT - ( 26 Dec 2017 08:36 )  PTT:30.1 sec    LIVER FUNCTIONS - ( 26 Dec 2017 07:33 )  Alb: 3.2 g/dL / Pro: 7.4 g/dL / ALK PHOS: 150 U/L / ALT: 16 U/L / AST: 22 U/L / GGT: x           Urinalysis Basic - ( 26 Dec 2017 09:33 )    Color: Yellow / Appearance: Clear / S.015 / pH: x  Gluc: x / Ketone: Negative  / Bili: Negative / Urobili: Negative mg/dL   Blood: x / Protein: 100 mg/dL / Nitrite: Negative   Leuk Esterase: Moderate / RBC: 0-2 /HPF / WBC 11-25   Sq Epi: x / Non Sq Epi: Few / Bacteria: Moderate        CARDIAC MARKERS ( 26 Dec 2017 13:50 )  x     / 0.04 ng/mL / x     / x     / x      CARDIAC MARKERS ( 26 Dec 2017 07:33 )  x     / 0.06 ng/mL / x     / x     / x [As Noted in HPI] : as noted in HPI [Negative] : Respiratory

## 2021-01-27 NOTE — PHYSICAL EXAM
[General Appearance - Alert] : alert [General Appearance - In No Acute Distress] : in no acute distress [General Appearance - Well Nourished] : well nourished [General Appearance - Well Developed] : well developed [Sclera] : the sclera and conjunctiva were normal [PERRL With Normal Accommodation] : pupils were equal in size, round, and reactive to light [Extraocular Movements] : extraocular movements were intact [Outer Ear] : the ears and nose were normal in appearance [Neck Appearance] : the appearance of the neck was normal [Jugular Venous Distention Increased] : there was no jugular-venous distention [Auscultation Breath Sounds / Voice Sounds] : lungs were clear to auscultation bilaterally [Heart Rate And Rhythm] : heart rate was normal and rhythm regular [Heart Sounds] : normal S1 and S2 [Heart Sounds Gallop] : no gallops [Murmurs] : no murmurs [Heart Sounds Pericardial Friction Rub] : no pericardial rub [Bowel Sounds] : normal bowel sounds [Abdomen Soft] : soft [Abdomen Tenderness] : non-tender [No CVA Tenderness] : no ~M costovertebral angle tenderness [No Spinal Tenderness] : no spinal tenderness [Abnormal Walk] : normal gait [Involuntary Movements] : no involuntary movements were seen [Nail Clubbing] : no clubbing  or cyanosis of the fingernails [Musculoskeletal - Swelling] : no joint swelling seen [Motor Tone] : muscle strength and tone were normal [] : no rash [Skin Lesions] : no skin lesions [Sensation] : the sensory exam was normal to light touch and pinprick [Motor Exam] : the motor exam was normal [No Focal Deficits] : no focal deficits [Oriented To Time, Place, And Person] : oriented to person, place, and time [Impaired Insight] : insight and judgment were intact [Affect] : the affect was normal [Mood] : the mood was normal [FreeTextEntry1] : +tenderness with mild synovitis to Right 2,3rd MCP

## 2021-01-27 NOTE — ASSESSMENT
[FreeTextEntry1] : 55 year old female presents today for an initial evaluation / establish care. Previously following Dr. Meza. Dx with RA and fibromyalgia. Previously on treatment with multiple agents- doesn’t remember all names ( treated with MTX, humira, Actemra, etc). Currently on RTX 1000mg IV q 6 months x 2 years now. Recently started on leflunomide 10mg daily, cyclobenzaprine 10 mg daily. Continues to have polyarthralgias, polyarthritis, morning stiffness 30 mins+ daily. \par \par - labs as below\par - imaging as below\par - Last RTX infusion 10/2020. Next due: 04/2020\par - consider increasing leflunomide to 20mg daily. Reports prior hx of elevated LFTS on MTX\par - consider adding SSZ/ HCQ?\par - c/w pain management f.u\par \par Discussed treatment plan with the patient. The patient was given the opportunity to ask questions and all questions were answered to their satisfaction.\par

## 2021-01-27 NOTE — HISTORY OF PRESENT ILLNESS
[FreeTextEntry1] : 55 year old female with PMH as listed below presents today for an initial evaluation\par \par denies fever, chills, weight loss, weight gain, fatigue, malaise, denies dry eye,eye pain, eye redness, vision changes, dry mouth, oral ulcers, nasal congestion, difficulty swallowing,  denies ear pain, hearing changes, denies chest pain, chest palpitations, denies sob, denies nausea, vomiting, abdominal pain, diarrhea, constipation, blood in stool, denies dysuria, blood in the urine, denies rashes, photosensitivity, hair loss, skin thickening, denies blood clots,  raynauds\par \par

## 2021-02-10 ENCOUNTER — APPOINTMENT (OUTPATIENT)
Dept: RHEUMATOLOGY | Facility: CLINIC | Age: 56
End: 2021-02-10

## 2021-02-26 LAB
25(OH)D3 SERPL-MCNC: 42.3 NG/ML
ALBUMIN MFR SERPL ELPH: 57.4 %
ALBUMIN SERPL ELPH-MCNC: 4.2 G/DL
ALBUMIN SERPL-MCNC: 4.2 G/DL
ALBUMIN/GLOB SERPL: 1.3 RATIO
ALP BLD-CCNC: 113 U/L
ALPHA1 GLOB MFR SERPL ELPH: 4.8 %
ALPHA1 GLOB SERPL ELPH-MCNC: 0.4 G/DL
ALPHA2 GLOB MFR SERPL ELPH: 10.6 %
ALPHA2 GLOB SERPL ELPH-MCNC: 0.8 G/DL
ALT SERPL-CCNC: 20 U/L
ANA PAT FLD IF-IMP: ABNORMAL
ANA SER IF-ACNC: ABNORMAL
ANION GAP SERPL CALC-SCNC: 13 MMOL/L
APPEARANCE: CLEAR
AST SERPL-CCNC: 21 U/L
B-GLOBULIN MFR SERPL ELPH: 11.5 %
B-GLOBULIN SERPL ELPH-MCNC: 0.9 G/DL
BASOPHILS # BLD AUTO: 0.05 K/UL
BASOPHILS NFR BLD AUTO: 1 %
BILIRUB SERPL-MCNC: 0.3 MG/DL
BILIRUBIN URINE: NEGATIVE
BLOOD URINE: NEGATIVE
BUN SERPL-MCNC: 9 MG/DL
C3 SERPL-MCNC: 158 MG/DL
C4 SERPL-MCNC: 30 MG/DL
CALCIUM SERPL-MCNC: 9.3 MG/DL
CCP AB SER IA-ACNC: 9 UNITS
CHLORIDE SERPL-SCNC: 103 MMOL/L
CO2 SERPL-SCNC: 25 MMOL/L
COLOR: NORMAL
CREAT SERPL-MCNC: 0.83 MG/DL
CREAT SPEC-SCNC: 80 MG/DL
CREAT/PROT UR: 0.1 RATIO
CRP SERPL-MCNC: 0.25 MG/DL
DSDNA AB SER-ACNC: <12 IU/ML
ENA RNP AB SER IA-ACNC: 1 AL
ENA SS-A AB SER IA-ACNC: <0.2 AL
ENA SS-B AB SER IA-ACNC: <0.2 AL
EOSINOPHIL # BLD AUTO: 0.17 K/UL
EOSINOPHIL NFR BLD AUTO: 3.3 %
ERYTHROCYTE [SEDIMENTATION RATE] IN BLOOD BY WESTERGREN METHOD: 32 MM/HR
GAMMA GLOB FLD ELPH-MCNC: 1.2 G/DL
GAMMA GLOB MFR SERPL ELPH: 15.7 %
GLUCOSE QUALITATIVE U: NEGATIVE
GLUCOSE SERPL-MCNC: 133 MG/DL
HAV IGM SER QL: NONREACTIVE
HBV CORE IGG+IGM SER QL: REACTIVE
HBV CORE IGM SER QL: NONREACTIVE
HBV E AB SER QL: NEGATIVE
HBV E AG SER QL: NEGATIVE
HBV SURFACE AB SER QL: REACTIVE
HBV SURFACE AG SER QL: NONREACTIVE
HCT VFR BLD CALC: 44 %
HCV AB SER QL: NONREACTIVE
HCV S/CO RATIO: 0.1 S/CO
HEPATITIS A IGG ANTIBODY: REACTIVE
HEPATITIS A IGG ANTIBODY: REACTIVE
HGB BLD-MCNC: 12.7 G/DL
IGA 24H UR QL IFE: NORMAL
IMM GRANULOCYTES NFR BLD AUTO: 0.2 %
INTERPRETATION SERPL IEP-IMP: NORMAL
KETONES URINE: NEGATIVE
LEUKOCYTE ESTERASE URINE: ABNORMAL
LYMPHOCYTES # BLD AUTO: 1.19 K/UL
LYMPHOCYTES NFR BLD AUTO: 23.1 %
M PROTEIN SPEC IFE-MCNC: NORMAL
M TB IFN-G BLD-IMP: NEGATIVE
MAN DIFF?: NORMAL
MCHC RBC-ENTMCNC: 21.3 PG
MCHC RBC-ENTMCNC: 28.9 GM/DL
MCV RBC AUTO: 73.8 FL
MONOCYTES # BLD AUTO: 0.43 K/UL
MONOCYTES NFR BLD AUTO: 8.3 %
NEUTROPHILS # BLD AUTO: 3.3 K/UL
NEUTROPHILS NFR BLD AUTO: 64.1 %
NITRITE URINE: NEGATIVE
PH URINE: 7
PLATELET # BLD AUTO: 305 K/UL
POTASSIUM SERPL-SCNC: 4.1 MMOL/L
PROT SERPL-MCNC: 6.6 G/DL
PROT SERPL-MCNC: 7.4 G/DL
PROT SERPL-MCNC: 7.4 G/DL
PROT UR-MCNC: 9 MG/DL
PROTEIN URINE: NEGATIVE
QUANTIFERON TB PLUS MITOGEN MINUS NIL: 8.73 IU/ML
QUANTIFERON TB PLUS NIL: 0.02 IU/ML
QUANTIFERON TB PLUS TB1 MINUS NIL: 0 IU/ML
QUANTIFERON TB PLUS TB2 MINUS NIL: 0 IU/ML
RBC # BLD: 5.96 M/UL
RBC # FLD: 15.8 %
RF+CCP IGG SER-IMP: NEGATIVE
RHEUMATOID FACT SER QL: <10 IU/ML
SODIUM SERPL-SCNC: 141 MMOL/L
SPECIFIC GRAVITY URINE: 1.01
THYROGLOB AB SERPL-ACNC: <20 IU/ML
THYROPEROXIDASE AB SERPL IA-ACNC: 10.9 IU/ML
TSH SERPL-ACNC: 2.86 UIU/ML
UROBILINOGEN URINE: NORMAL
WBC # FLD AUTO: 5.15 K/UL

## 2021-03-08 NOTE — ASSESSMENT
"Subjective   Annie Mejia is a 76 y.o. female.     History of Present Illness   Mrs. Mejia presents today for follow-up after recent ER visit.  Seen at [facility] ER on 2/28/2021 for abdominal pain and diarrhea.  Began acutely 3 to 4 hours prior to presentation.  Felt it was triggered by mushrooms.  Diagnosed with enterocolitis based on CT findings.  Treated with Cipro, Bentyl and Zofran.  Since leaving ER her symptoms have resolved.  Feels she is \"back to normal\".    She has questions regarding dosing of her insulin.  She is on Levemir 5 units up to twice daily.  Blood sugar log shows average blood sugar under 200.  Rare blood sugar in high 200s, low 300s.  No hypoglycemic episodes.  Last A1c at goal.  She has had difficulty tolerating any oral diabetes medication.    She has hypertension currently treated with lisinopril, Inderal, amlodipine.  Blood pressure has been well controlled.  Log reviewed from home.    On statin for peripheral vascular disease, hyperlipidemia, history of stroke.  Tolerating well.  Taking as prescribed.    Mild renal insufficiency noted to be stable during recent ER visit.    The following portions of the patient's history were reviewed and updated as appropriate: allergies, current medications, past family history, past medical history, past social history, past surgical history and problem list.    Review of Systems   Constitutional: Positive for appetite change and fatigue. Negative for chills and fever.   HENT: Positive for congestion and postnasal drip. Negative for mouth sores, nosebleeds, rhinorrhea and sore throat.    Eyes: Positive for visual disturbance (chronic).   Respiratory: Positive for cough (dry, intermittent) and shortness of breath (with anxiety). Negative for wheezing.    Cardiovascular: Positive for chest pain (with anxiety) and palpitations (with anxiety). Negative for leg swelling.   Gastrointestinal: Positive for abdominal pain, diarrhea and nausea. " [FreeTextEntry1] : 55 year old female presents today for an initial evaluation / establish care. Previously following Dr. Meza. Dx with RA and fibromyalgia. Previously on treatment with multiple agents- doesn’t remember all names ( treated with MTX, humira, Actemra, etc). Currently on RTX 1000mg IV q 6 months x 2 years now. Recently started on leflunomide 10mg daily, cyclobenzaprine 10 mg daily. Continues to have polyarthralgias, polyarthritis, morning stiffness 30 mins+ daily. \par \par - labs as below\par - imaging as below\par - Last RTX infusion 10/2020. Next due: 04/2020\par - consider increasing leflunomide to 20mg daily. Reports prior hx of elevated LFTS on MTX\par - consider adding SSZ/ HCQ?\par - c/w pain management f.u\par \par Discussed treatment plan with the patient. The patient was given the opportunity to ask questions and all questions were answered to their satisfaction.\par  Negative for blood in stool and vomiting.        Heartburn   Genitourinary: Negative for dysuria, hematuria and urgency.   Musculoskeletal: Positive for arthralgias, back pain, gait problem and myalgias.   Skin: Negative for rash and wound.   Neurological: Positive for dizziness and weakness. Negative for syncope and headaches.   Hematological: Negative for adenopathy. Bruises/bleeds easily.   Psychiatric/Behavioral: Positive for dysphoric mood and sleep disturbance. Negative for confusion. The patient is nervous/anxious.    Pt's previous ROS reviewed and updated as indicated.       Objective    Vitals:    03/08/21 0921   BP: 136/72   Pulse: 72   Resp: 20   Temp: 97.1 °F (36.2 °C)   SpO2: 97%     Body mass index is 31.76 kg/m².      03/08/21 0921   Weight: 92 kg (202 lb 12.8 oz)       Physical Exam  Vitals and nursing note reviewed.   Constitutional:       General: She is not in acute distress.     Appearance: She is well-developed and well-groomed. She is obese. She is not ill-appearing.   HENT:      Head: Normocephalic and atraumatic.   Eyes:      General: No scleral icterus.     Extraocular Movements: Extraocular movements intact.      Conjunctiva/sclera: Conjunctivae normal.   Cardiovascular:      Rate and Rhythm: Normal rate and regular rhythm.      Pulses: Normal pulses.      Heart sounds: Normal heart sounds.   Pulmonary:      Effort: Pulmonary effort is normal.      Breath sounds: Normal breath sounds. No wheezing, rhonchi or rales.   Abdominal:      General: Bowel sounds are normal. There is no distension.      Palpations: Abdomen is soft.      Tenderness: There is generalized abdominal tenderness (mild). There is no right CVA tenderness, left CVA tenderness, guarding or rebound.   Musculoskeletal:      Right lower leg: No edema.      Left lower leg: No edema.   Skin:     General: Skin is warm and dry.      Coloration: Skin is not jaundiced or pale.      Findings: No rash.   Neurological:      Mental  Status: She is alert and oriented to person, place, and time.      Gait: Gait abnormal (antalgic).   Psychiatric:         Mood and Affect: Mood and affect normal.         Behavior: Behavior normal. Behavior is cooperative.         Thought Content: Thought content normal. Thought content is not paranoid. Thought content does not include suicidal ideation.         Cognition and Memory: Cognition normal.     Pt's previous physical exam reviewed and updated as indicated.    Recent Results (from the past 336 hour(s))   Comprehensive Metabolic Panel    Collection Time: 02/28/21 11:42 PM    Specimen: Blood   Result Value Ref Range    Glucose 213 (H) 65 - 99 mg/dL    BUN 31 (H) 8 - 23 mg/dL    Creatinine 1.07 (H) 0.57 - 1.00 mg/dL    Sodium 139 136 - 145 mmol/L    Potassium 4.1 3.5 - 5.2 mmol/L    Chloride 106 98 - 107 mmol/L    CO2 25.5 22.0 - 29.0 mmol/L    Calcium 9.2 8.6 - 10.5 mg/dL    Total Protein 6.4 6.0 - 8.5 g/dL    Albumin 3.80 3.50 - 5.20 g/dL    ALT (SGPT) 18 1 - 33 U/L    AST (SGOT) 17 1 - 32 U/L    Alkaline Phosphatase 91 39 - 117 U/L    Total Bilirubin 0.2 0.0 - 1.2 mg/dL    eGFR Non African Amer 50 (L) >60 mL/min/1.73    Globulin 2.6 gm/dL    A/G Ratio 1.5 g/dL    BUN/Creatinine Ratio 29.0 (H) 7.0 - 25.0    Anion Gap 7.5 5.0 - 15.0 mmol/L   Lipase    Collection Time: 02/28/21 11:42 PM    Specimen: Blood   Result Value Ref Range    Lipase 59 13 - 60 U/L   Light Blue Top    Collection Time: 02/28/21 11:42 PM   Result Value Ref Range    Extra Tube hold for add-on    Green Top (Gel)    Collection Time: 02/28/21 11:42 PM   Result Value Ref Range    Extra Tube Hold for add-ons.    Lavender Top    Collection Time: 02/28/21 11:42 PM   Result Value Ref Range    Extra Tube hold for add-on    Gold Top - SST    Collection Time: 02/28/21 11:42 PM   Result Value Ref Range    Extra Tube Hold for add-ons.    CBC Auto Differential    Collection Time: 02/28/21 11:42 PM    Specimen: Blood   Result Value Ref Range    WBC 13.51  (H) 3.40 - 10.80 10*3/mm3    RBC 4.07 3.77 - 5.28 10*6/mm3    Hemoglobin 12.0 12.0 - 15.9 g/dL    Hematocrit 37.9 34.0 - 46.6 %    MCV 93.1 79.0 - 97.0 fL    MCH 29.5 26.6 - 33.0 pg    MCHC 31.7 31.5 - 35.7 g/dL    RDW 14.4 12.3 - 15.4 %    RDW-SD 49.2 37.0 - 54.0 fl    MPV 11.9 6.0 - 12.0 fL    Platelets 185 140 - 450 10*3/mm3   Scan Slide    Collection Time: 02/28/21 11:42 PM    Specimen: Blood   Result Value Ref Range    Scan Slide     Manual Differential    Collection Time: 02/28/21 11:42 PM    Specimen: Blood   Result Value Ref Range    Neutrophil % 57.0 42.7 - 76.0 %    Lymphocyte % 21.0 19.6 - 45.3 %    Monocyte % 3.0 (L) 5.0 - 12.0 %    Eosinophil % 1.0 0.3 - 6.2 %    Bands %  14.0 (H) 0.0 - 5.0 %    Metamyelocyte % 4.0 (H) 0.0 - 0.0 %    Neutrophils Absolute 9.59 (H) 1.70 - 7.00 10*3/mm3    Lymphocytes Absolute 2.84 0.70 - 3.10 10*3/mm3    Monocytes Absolute 0.41 0.10 - 0.90 10*3/mm3    Eosinophils Absolute 0.14 0.00 - 0.40 10*3/mm3    nRBC 1.0 (H) 0.0 - 0.2 /100 WBC    RBC Morphology Normal Normal    WBC Morphology Normal Normal    Platelet Estimate Adequate Normal   Urinalysis With Microscopic If Indicated (No Culture) - Urine, Clean Catch    Collection Time: 03/01/21 12:38 AM    Specimen: Urine, Clean Catch   Result Value Ref Range    Color, UA Yellow Yellow, Straw    Appearance, UA Clear Clear    pH, UA 6.5 5.0 - 8.0    Specific Gravity, UA 1.010 1.005 - 1.030    Glucose,  mg/dL (Trace) (A) Negative    Ketones, UA Negative Negative    Bilirubin, UA Negative Negative    Blood, UA Negative Negative    Protein, UA Negative Negative    Leuk Esterase, UA Negative Negative    Nitrite, UA Negative Negative    Urobilinogen, UA 0.2 E.U./dL 0.2 - 1.0 E.U./dL     Lab Results   Component Value Date    HGBA1C 6.80 (H) 01/18/2021    HGBA1C 7.40 (H) 12/08/2020    HGBA1C 8.80 (H) 09/29/2020     Lab Results   Component Value Date    MICROALBUR 30.6 03/11/2020    CREATININE 1.07 (H) 02/28/2021     EKG reviewed on  chart 2/7/2021    Colonoscopy report 6/25/2020- several benign polyps    CT Abdomen Pelvis Without Contrast  Result Date: 3/1/2021  IMPRESSION: Findings consistent with enterocolitis. Nonemergent/incidental findings in the report. Authenticated by Vasyl Esteves MD on 03/01/2021 12:37:58 AM    Assessment/Plan   Diagnoses and all orders for this visit:    1. Type 2 diabetes mellitus without complication, with long-term current use of insulin (CMS/[facility]) (Primary)    2. Essential hypertension    3. Enterocolitis    4. Renal insufficiency       Insulin-dependent diabetes mellitus controlled.  Continue Levemir 5 units twice daily.  Recheck A1c at next visit when due.  She is encouraged to follow diabetic appropriate diet.    Hypertension controlled, continue lisinopril, Norvasc, Inderal.    Enterocolitis appears to have resolved.  Encouraged to advance diet as tolerated.    Mild renal insufficiency stable.  Avoid NSAIDs.    Follow-up in 6 weeks sooner as needed.  Patient was encouraged to keep me informed of any acute changes, lack of improvement, or any new concerning symptoms.  Pt is aware of reasons to seek emergent care.  Patient voiced understanding of all instructions and denied further questions.          Please note that portions of this note may have been completed with a voice recognition program. Efforts were made to edit the dictations, but occasionally words are mistranscribed.

## 2021-04-20 ENCOUNTER — APPOINTMENT (OUTPATIENT)
Dept: RHEUMATOLOGY | Facility: CLINIC | Age: 56
End: 2021-04-20
Payer: MEDICARE

## 2021-04-20 VITALS
RESPIRATION RATE: 17 BRPM | BODY MASS INDEX: 27.32 KG/M2 | SYSTOLIC BLOOD PRESSURE: 120 MMHG | TEMPERATURE: 97.8 F | WEIGHT: 164 LBS | OXYGEN SATURATION: 99 % | HEIGHT: 65 IN | DIASTOLIC BLOOD PRESSURE: 78 MMHG | HEART RATE: 123 BPM

## 2021-04-20 PROCEDURE — 99072 ADDL SUPL MATRL&STAF TM PHE: CPT

## 2021-04-20 PROCEDURE — 99214 OFFICE O/P EST MOD 30 MIN: CPT

## 2021-04-20 NOTE — PHYSICAL EXAM
[General Appearance - Alert] : alert [General Appearance - In No Acute Distress] : in no acute distress [General Appearance - Well Nourished] : well nourished [General Appearance - Well Developed] : well developed [Sclera] : the sclera and conjunctiva were normal [Extraocular Movements] : extraocular movements were intact [PERRL With Normal Accommodation] : pupils were equal in size, round, and reactive to light [Outer Ear] : the ears and nose were normal in appearance [Neck Appearance] : the appearance of the neck was normal [Jugular Venous Distention Increased] : there was no jugular-venous distention [Auscultation Breath Sounds / Voice Sounds] : lungs were clear to auscultation bilaterally [Heart Rate And Rhythm] : heart rate was normal and rhythm regular [Heart Sounds] : normal S1 and S2 [Heart Sounds Gallop] : no gallops [Murmurs] : no murmurs [Heart Sounds Pericardial Friction Rub] : no pericardial rub [Bowel Sounds] : normal bowel sounds [Abdomen Soft] : soft [Abdomen Tenderness] : non-tender [No CVA Tenderness] : no ~M costovertebral angle tenderness [No Spinal Tenderness] : no spinal tenderness [Abnormal Walk] : normal gait [Nail Clubbing] : no clubbing  or cyanosis of the fingernails [Involuntary Movements] : no involuntary movements were seen [Musculoskeletal - Swelling] : no joint swelling seen [Motor Tone] : muscle strength and tone were normal [] : no rash [Skin Lesions] : no skin lesions [Sensation] : the sensory exam was normal to light touch and pinprick [Motor Exam] : the motor exam was normal [No Focal Deficits] : no focal deficits [Oriented To Time, Place, And Person] : oriented to person, place, and time [Impaired Insight] : insight and judgment were intact [Affect] : the affect was normal [Mood] : the mood was normal

## 2021-04-21 NOTE — ASSESSMENT
[FreeTextEntry1] : 55 year old female presents today for an f.u visit. Previously following Dr. Meza. Dx with RA and fibromyalgia. Previously on treatment with multiple agents- doesn’t remember all names ( treated with MTX, humira, Actemra, etc). Currently on RTX 1000 mg IV q 6 months x 2 years now. Recently started on leflunomide 10mg every other daily, cyclobenzaprine 10 mg daily. \par \par - +Hep B core AB. Evaluated by GI in the past and cleared for immunosuppression.\par - Quantiferon: negative\par - c/w leflunomide 10 mg every other day\par - On RTX 1000mg IV q 6 months. Last RTX infusion 10/2020. Next due: 04/2021. Wishes to hold off further RTX for now as planning to get COVID vaccine in 2 weeks. \par - c/w cyclobenzaprine 10 mg daily\par - c/w pain management f.u\par \par Discussed treatment plan with the patient. The patient was given the opportunity to ask questions and all questions were answered to their satisfaction.\par

## 2021-04-21 NOTE — HISTORY OF PRESENT ILLNESS
[FreeTextEntry1] : Pt presenting today for a f.u visit. Labs reviewed with pt.  +Hep B core AB. Evaluated by GI in the past and cleared for immunosuppression. Now taking leflunomide 10 mg every other day. \par On RTX 1000mg IV q 6 months. Last RTX infusion 10/2020. Next due: 04/2021.\par Wishes to hold off further RTX for now as planning to get COVID vaccine in 2 weeks. \par Minimal symptoms today. No acute complaints. \par denies fever, chills, denies chest pain, chest palpitations, denies sob, denies nausea, vomiting, abdominal pain, denies rashes

## 2021-06-05 ENCOUNTER — APPOINTMENT (OUTPATIENT)
Dept: OBGYN | Facility: CLINIC | Age: 56
End: 2021-06-05
Payer: MEDICARE

## 2021-06-05 VITALS
DIASTOLIC BLOOD PRESSURE: 74 MMHG | WEIGHT: 155 LBS | BODY MASS INDEX: 25.83 KG/M2 | HEIGHT: 65 IN | SYSTOLIC BLOOD PRESSURE: 120 MMHG

## 2021-06-05 DIAGNOSIS — Z01.419 ENCOUNTER FOR GYNECOLOGICAL EXAMINATION (GENERAL) (ROUTINE) W/OUT ABNORMAL FINDINGS: ICD-10-CM

## 2021-06-05 DIAGNOSIS — N95.2 POSTMENOPAUSAL ATROPHIC VAGINITIS: ICD-10-CM

## 2021-06-05 PROCEDURE — 99072 ADDL SUPL MATRL&STAF TM PHE: CPT

## 2021-06-05 PROCEDURE — 99386 PREV VISIT NEW AGE 40-64: CPT

## 2021-06-05 NOTE — PLAN
[FreeTextEntry1] : PT WITH VAGINAL ATROPHY. DISCUSSED THE USE OF VAGINAL ESTROGEN. INSTRUCTIONS AND PRESCRIPTION GIVEN. NO CONTRAINDICATION TO USE. FOLLOWUP SCHEDULED. ANNUAL GYN EXAMINATION. DISCUSSED HORMONE REPLACEMENT THERAPY, RISKS, BENEFITS AND ALTERNATIVES. MAMMOGRAM ORDERED AND SELF BREAST EXAMINATION TAUGHT AND RECOMMENDED. BONE DENSITY STUDY ORDERED

## 2021-06-05 NOTE — COUNSELING
[Nutrition/ Exercise/ Weight Management] : nutrition, exercise, weight management [Body Image] : body image [Breast Self Exam] : breast self exam [Bladder Hygiene] : bladder hygiene

## 2021-06-05 NOTE — PHYSICAL EXAM
[Appropriately responsive] : appropriately responsive [Alert] : alert [No Acute Distress] : no acute distress [No Lymphadenopathy] : no lymphadenopathy [Regular Rate Rhythm] : regular rate rhythm [No Murmurs] : no murmurs [Clear to Auscultation B/L] : clear to auscultation bilaterally [Soft] : soft [Non-tender] : non-tender [Non-distended] : non-distended [No HSM] : No HSM [No Lesions] : no lesions [No Mass] : no mass [Oriented x3] : oriented x3 [Examination Of The Breasts] : a normal appearance [No Masses] : no breast masses were palpable [Vulvar Atrophy] : vulvar atrophy [Labia Majora] : normal [Labia Minora] : normal [Atrophy] : atrophy [Uterine Adnexae] : normal [Normal rectal exam] : was normal [Normal Brown Stool] : was normal and brown [Normal] : was normal [None] : there was no rectal mass  [Occult Blood Positive] : was negative for occult blood analysis [Internal Hemorrhoid] : no internal hemorrhoids were present [External Hemorrhoid] : no external hemorrhoids were present [Skin Tags] : no residual hemorrhoidal skin tags

## 2021-06-22 ENCOUNTER — NON-APPOINTMENT (OUTPATIENT)
Age: 56
End: 2021-06-22

## 2021-06-30 ENCOUNTER — APPOINTMENT (OUTPATIENT)
Dept: RHEUMATOLOGY | Facility: CLINIC | Age: 56
End: 2021-06-30
Payer: MEDICARE

## 2021-06-30 VITALS
HEIGHT: 65 IN | DIASTOLIC BLOOD PRESSURE: 84 MMHG | RESPIRATION RATE: 17 BRPM | SYSTOLIC BLOOD PRESSURE: 122 MMHG | OXYGEN SATURATION: 98 % | HEART RATE: 117 BPM | BODY MASS INDEX: 25.83 KG/M2 | TEMPERATURE: 98 F | WEIGHT: 155 LBS

## 2021-06-30 PROCEDURE — 99072 ADDL SUPL MATRL&STAF TM PHE: CPT

## 2021-06-30 PROCEDURE — 99214 OFFICE O/P EST MOD 30 MIN: CPT

## 2021-06-30 NOTE — HISTORY OF PRESENT ILLNESS
[FreeTextEntry1] : Pt presenting today for a f.u visit. Currently taking leflunomide 10 mg every other day. \par On RTX 1000mg IV q 6 months. Last RTX infusion 10/2020. Hx of +Hep B core AB. Evaluated by GI in the past and recommended prophylactic treatment as she has moderate risk for reactivation but pt declined. \par Today presenting with increased polyarthralgias, polyarthritis. \par denies fever, chills, denies chest pain, chest palpitations, denies sob, denies nausea, vomiting, abdominal pain, denies rashes

## 2021-06-30 NOTE — PHYSICAL EXAM
[General Appearance - Alert] : alert [General Appearance - In No Acute Distress] : in no acute distress [General Appearance - Well Nourished] : well nourished [General Appearance - Well Developed] : well developed [Sclera] : the sclera and conjunctiva were normal [PERRL With Normal Accommodation] : pupils were equal in size, round, and reactive to light [Extraocular Movements] : extraocular movements were intact [Outer Ear] : the ears and nose were normal in appearance [Neck Appearance] : the appearance of the neck was normal [Jugular Venous Distention Increased] : there was no jugular-venous distention [Auscultation Breath Sounds / Voice Sounds] : lungs were clear to auscultation bilaterally [Heart Rate And Rhythm] : heart rate was normal and rhythm regular [Heart Sounds] : normal S1 and S2 [Heart Sounds Gallop] : no gallops [Murmurs] : no murmurs [Heart Sounds Pericardial Friction Rub] : no pericardial rub [Bowel Sounds] : normal bowel sounds [Abdomen Soft] : soft [Abdomen Tenderness] : non-tender [No CVA Tenderness] : no ~M costovertebral angle tenderness [No Spinal Tenderness] : no spinal tenderness [Abnormal Walk] : normal gait [Nail Clubbing] : no clubbing  or cyanosis of the fingernails [Involuntary Movements] : no involuntary movements were seen [Musculoskeletal - Swelling] : no joint swelling seen [Motor Tone] : muscle strength and tone were normal [Skin Lesions] : no skin lesions [] : no rash [Sensation] : the sensory exam was normal to light touch and pinprick [Motor Exam] : the motor exam was normal [No Focal Deficits] : no focal deficits [Oriented To Time, Place, And Person] : oriented to person, place, and time [Impaired Insight] : insight and judgment were intact [Affect] : the affect was normal [Mood] : the mood was normal

## 2021-06-30 NOTE — ASSESSMENT
[FreeTextEntry1] : 55 year old female presents today for an f.u visit. Previously following Dr. Meza. Dx with RA and fibromyalgia. Previously on treatment with multiple agents- doesn’t remember all names ( treated with MTX, humira, Actemra, etc). Currently on RTX 1000 mg IV q 6 months x 2 years now. Recently started on leflunomide 10mg every other daily, cyclobenzaprine 10 mg daily.\par \par Last RTX infusion 10/2020. \par Hx of +Hep B core AB. Evaluated by GI in the past and recommended prophylactic treatment as she has moderate risk for reactivation but pt declined. \par \par - repeat labs today\par - +Hep B core AB\par - Quantiferon: negative\par - c/w leflunomide 10 mg every other day\par - c/w RTX 1000mg IV q 6 months\par - c/w cyclobenzaprine 10 mg daily\par - c/w pain management f.u\par - reviewed risk and benefits of medications. Pt understands the risk of RTX. She understands that she should consider starting  prophylactic treatment. She is at risk for reactivation and decimination of Hep B. \par \par Discussed treatment plan with the patient. The patient was given the opportunity to ask questions and all questions were answered to their satisfaction.\par

## 2021-07-14 LAB
25(OH)D3 SERPL-MCNC: 52 NG/ML
ALBUMIN SERPL ELPH-MCNC: 4.3 G/DL
ALP BLD-CCNC: 99 U/L
ALT SERPL-CCNC: 15 U/L
ANION GAP SERPL CALC-SCNC: 14 MMOL/L
APPEARANCE: ABNORMAL
AST SERPL-CCNC: 17 U/L
BASOPHILS # BLD AUTO: 0.05 K/UL
BASOPHILS NFR BLD AUTO: 0.7 %
BILIRUB SERPL-MCNC: 0.3 MG/DL
BILIRUBIN URINE: NEGATIVE
BLOOD URINE: NORMAL
BUN SERPL-MCNC: 10 MG/DL
CALCIUM SERPL-MCNC: 9.6 MG/DL
CCP AB SER IA-ACNC: <8 UNITS
CHLORIDE SERPL-SCNC: 103 MMOL/L
CK SERPL-CCNC: 53 U/L
CO2 SERPL-SCNC: 23 MMOL/L
COLOR: NORMAL
CREAT SERPL-MCNC: 0.69 MG/DL
CREAT SPEC-SCNC: 43 MG/DL
CREAT/PROT UR: 0.1 RATIO
CRP SERPL-MCNC: <3 MG/L
EOSINOPHIL # BLD AUTO: 0.13 K/UL
EOSINOPHIL NFR BLD AUTO: 1.9 %
ERYTHROCYTE [SEDIMENTATION RATE] IN BLOOD BY WESTERGREN METHOD: 53 MM/HR
GLUCOSE QUALITATIVE U: NEGATIVE
GLUCOSE SERPL-MCNC: 111 MG/DL
HBV DNA # SERPL NAA+PROBE: NOT DETECTED IU/ML
HCT VFR BLD CALC: 42.3 %
HEPB DNA PCR LOG: NOT DETECTED LOG10IU/ML
HGB BLD-MCNC: 12.9 G/DL
IMM GRANULOCYTES NFR BLD AUTO: 0.1 %
KETONES URINE: NEGATIVE
LEUKOCYTE ESTERASE URINE: ABNORMAL
LYMPHOCYTES # BLD AUTO: 1.39 K/UL
LYMPHOCYTES NFR BLD AUTO: 20.3 %
MAN DIFF?: NORMAL
MCHC RBC-ENTMCNC: 21.8 PG
MCHC RBC-ENTMCNC: 30.5 GM/DL
MCV RBC AUTO: 71.3 FL
MONOCYTES # BLD AUTO: 0.56 K/UL
MONOCYTES NFR BLD AUTO: 8.2 %
NEUTROPHILS # BLD AUTO: 4.72 K/UL
NEUTROPHILS NFR BLD AUTO: 68.8 %
NITRITE URINE: NEGATIVE
PH URINE: 7
PLATELET # BLD AUTO: 325 K/UL
POTASSIUM SERPL-SCNC: 3.8 MMOL/L
PROT SERPL-MCNC: 6.9 G/DL
PROT UR-MCNC: 6 MG/DL
PROTEIN URINE: NEGATIVE
RBC # BLD: 5.93 M/UL
RBC # FLD: 17.6 %
RF+CCP IGG SER-IMP: NEGATIVE
RHEUMATOID FACT SER QL: <10 IU/ML
SODIUM SERPL-SCNC: 140 MMOL/L
SPECIFIC GRAVITY URINE: 1.01
TSH SERPL-ACNC: 0.39 UIU/ML
UROBILINOGEN URINE: NORMAL
WBC # FLD AUTO: 6.86 K/UL

## 2021-07-14 RX ORDER — METHYLPREDNISOLONE 125 MG/2ML
125 INJECTION, POWDER, LYOPHILIZED, FOR SOLUTION INTRAMUSCULAR; INTRAVENOUS
Qty: 0 | Refills: 0 | Status: COMPLETED | OUTPATIENT
Start: 2021-07-14 | End: 1900-01-01

## 2021-07-14 RX ORDER — DIPHENHYDRAMINE HYDROCHLORIDE 50 MG/ML
50 INJECTION, SOLUTION INTRAMUSCULAR; INTRAVENOUS
Qty: 1 | Refills: 0 | Status: COMPLETED | OUTPATIENT
Start: 2021-07-14 | End: 1900-01-01

## 2021-07-14 RX ORDER — ACETAMINOPHEN 500 MG/1
500 TABLET ORAL
Qty: 0 | Refills: 0 | Status: COMPLETED | OUTPATIENT
Start: 2021-07-14 | End: 1900-01-01

## 2021-07-22 ENCOUNTER — APPOINTMENT (OUTPATIENT)
Dept: RHEUMATOLOGY | Facility: CLINIC | Age: 56
End: 2021-07-22
Payer: MEDICARE

## 2021-07-22 VITALS
OXYGEN SATURATION: 96 % | RESPIRATION RATE: 17 BRPM | HEART RATE: 99 BPM | SYSTOLIC BLOOD PRESSURE: 122 MMHG | DIASTOLIC BLOOD PRESSURE: 83 MMHG

## 2021-07-22 VITALS
RESPIRATION RATE: 16 BRPM | HEART RATE: 96 BPM | SYSTOLIC BLOOD PRESSURE: 120 MMHG | DIASTOLIC BLOOD PRESSURE: 78 MMHG | OXYGEN SATURATION: 97 %

## 2021-07-22 VITALS
OXYGEN SATURATION: 96 % | HEART RATE: 102 BPM | RESPIRATION RATE: 18 BRPM | DIASTOLIC BLOOD PRESSURE: 85 MMHG | SYSTOLIC BLOOD PRESSURE: 127 MMHG

## 2021-07-22 VITALS
OXYGEN SATURATION: 99 % | SYSTOLIC BLOOD PRESSURE: 134 MMHG | TEMPERATURE: 98 F | DIASTOLIC BLOOD PRESSURE: 99 MMHG | HEART RATE: 114 BPM | RESPIRATION RATE: 16 BRPM

## 2021-07-22 VITALS
RESPIRATION RATE: 18 BRPM | OXYGEN SATURATION: 94 % | DIASTOLIC BLOOD PRESSURE: 89 MMHG | HEART RATE: 105 BPM | SYSTOLIC BLOOD PRESSURE: 134 MMHG

## 2021-07-22 VITALS
DIASTOLIC BLOOD PRESSURE: 84 MMHG | HEART RATE: 97 BPM | OXYGEN SATURATION: 97 % | RESPIRATION RATE: 17 BRPM | SYSTOLIC BLOOD PRESSURE: 130 MMHG

## 2021-07-22 PROCEDURE — 99072 ADDL SUPL MATRL&STAF TM PHE: CPT

## 2021-07-22 PROCEDURE — 96375 TX/PRO/DX INJ NEW DRUG ADDON: CPT

## 2021-07-22 PROCEDURE — 96415 CHEMO IV INFUSION ADDL HR: CPT

## 2021-07-22 PROCEDURE — 96413 CHEMO IV INFUSION 1 HR: CPT

## 2021-07-22 RX ORDER — DIPHENHYDRAMINE HYDROCHLORIDE 50 MG/ML
50 INJECTION, SOLUTION INTRAMUSCULAR; INTRAVENOUS
Qty: 1 | Refills: 0 | Status: COMPLETED | OUTPATIENT
Start: 2021-07-16

## 2021-07-22 RX ORDER — ACETAMINOPHEN 500 MG/1
500 TABLET ORAL
Qty: 0 | Refills: 0 | Status: COMPLETED | OUTPATIENT
Start: 2021-07-16

## 2021-07-22 RX ORDER — RITUXIMAB 10 MG/ML
500 INJECTION, SOLUTION INTRAVENOUS
Qty: 0 | Refills: 0 | Status: COMPLETED | OUTPATIENT
Start: 2021-07-14

## 2021-07-22 RX ORDER — METHYLPREDNISOLONE 125 MG/2ML
125 INJECTION, POWDER, LYOPHILIZED, FOR SOLUTION INTRAMUSCULAR; INTRAVENOUS
Qty: 0 | Refills: 0 | Status: COMPLETED | OUTPATIENT
Start: 2021-07-16

## 2021-08-05 ENCOUNTER — APPOINTMENT (OUTPATIENT)
Dept: RHEUMATOLOGY | Facility: CLINIC | Age: 56
End: 2021-08-05
Payer: MEDICARE

## 2021-08-05 VITALS
TEMPERATURE: 96.7 F | SYSTOLIC BLOOD PRESSURE: 115 MMHG | DIASTOLIC BLOOD PRESSURE: 80 MMHG | OXYGEN SATURATION: 99 % | RESPIRATION RATE: 17 BRPM | HEART RATE: 104 BPM

## 2021-08-05 VITALS
HEART RATE: 86 BPM | RESPIRATION RATE: 18 BRPM | DIASTOLIC BLOOD PRESSURE: 88 MMHG | OXYGEN SATURATION: 100 % | SYSTOLIC BLOOD PRESSURE: 125 MMHG

## 2021-08-05 VITALS
SYSTOLIC BLOOD PRESSURE: 120 MMHG | HEART RATE: 84 BPM | DIASTOLIC BLOOD PRESSURE: 82 MMHG | OXYGEN SATURATION: 100 % | RESPIRATION RATE: 19 BRPM

## 2021-08-05 VITALS
OXYGEN SATURATION: 98 % | SYSTOLIC BLOOD PRESSURE: 126 MMHG | RESPIRATION RATE: 17 BRPM | DIASTOLIC BLOOD PRESSURE: 85 MMHG | HEART RATE: 109 BPM

## 2021-08-05 PROCEDURE — 96375 TX/PRO/DX INJ NEW DRUG ADDON: CPT

## 2021-08-05 PROCEDURE — 96413 CHEMO IV INFUSION 1 HR: CPT

## 2021-08-05 PROCEDURE — 96415 CHEMO IV INFUSION ADDL HR: CPT

## 2021-08-05 RX ORDER — ACETAMINOPHEN 500 MG/1
500 TABLET ORAL
Qty: 0 | Refills: 0 | Status: COMPLETED | OUTPATIENT
Start: 2021-07-31

## 2021-08-05 RX ORDER — RITUXIMAB 10 MG/ML
500 INJECTION, SOLUTION INTRAVENOUS
Qty: 0 | Refills: 0 | Status: COMPLETED | OUTPATIENT
Start: 2021-07-14

## 2021-08-05 RX ORDER — METHYLPREDNISOLONE 125 MG/2ML
125 INJECTION, POWDER, LYOPHILIZED, FOR SOLUTION INTRAMUSCULAR; INTRAVENOUS
Qty: 0 | Refills: 0 | Status: COMPLETED | OUTPATIENT
Start: 2021-07-31

## 2021-08-05 RX ORDER — DIPHENHYDRAMINE HYDROCHLORIDE 50 MG/ML
50 INJECTION, SOLUTION INTRAMUSCULAR; INTRAVENOUS
Qty: 1 | Refills: 0 | Status: COMPLETED | OUTPATIENT
Start: 2021-07-31

## 2021-08-20 ENCOUNTER — APPOINTMENT (OUTPATIENT)
Dept: OBGYN | Facility: CLINIC | Age: 56
End: 2021-08-20
Payer: MEDICARE

## 2021-08-20 VITALS
SYSTOLIC BLOOD PRESSURE: 120 MMHG | HEIGHT: 65 IN | DIASTOLIC BLOOD PRESSURE: 70 MMHG | WEIGHT: 155 LBS | TEMPERATURE: 97 F | BODY MASS INDEX: 25.83 KG/M2

## 2021-08-20 DIAGNOSIS — Z87.42 PERSONAL HISTORY OF OTHER DISEASES OF THE FEMALE GENITAL TRACT: ICD-10-CM

## 2021-08-20 PROCEDURE — 99214 OFFICE O/P EST MOD 30 MIN: CPT | Mod: 25

## 2021-08-20 PROCEDURE — 93976 VASCULAR STUDY: CPT

## 2021-08-20 PROCEDURE — 76830 TRANSVAGINAL US NON-OB: CPT

## 2021-08-24 NOTE — CHIEF COMPLAINT
[Urgent Visit] : Urgent Visit [FreeTextEntry1] : PT PRESENTS FOR EVALUATION OF POSTMENOPAUSAL BLEEDING

## 2021-08-24 NOTE — PROCEDURE
[Abnormal Uterine Bleeding] : abnormal uterine bleeding [Transvaginal Ultrasound] : transvaginal ultrasound [Color Doppler Imaging] : color doppler imaging [Anteverted] : anteverted [L: ___ cm] : L: [unfilled] cm [W: ___cm] : W: [unfilled] cm [H: ___ cm] : H: [unfilled] cm [FreeTextEntry3] : ENDOMETRIAL LINING THICKNESS IS 6.86 MM [FreeTextEntry7] : 2.32 X 1.43 [FreeTextEntry8] : 2.67 X 1.45 [FreeTextEntry4] : THICKENED ENDOMETRIUM IN PATIENT WITH POSTMENOPAUSAL BLEEDING. CORRELATE WITH TISSUE SAMPLING

## 2021-08-25 ENCOUNTER — NON-APPOINTMENT (OUTPATIENT)
Age: 56
End: 2021-08-25

## 2021-08-25 ENCOUNTER — APPOINTMENT (OUTPATIENT)
Dept: CARDIOLOGY | Facility: CLINIC | Age: 56
End: 2021-08-25
Payer: MEDICARE

## 2021-08-25 VITALS
SYSTOLIC BLOOD PRESSURE: 114 MMHG | HEIGHT: 66 IN | OXYGEN SATURATION: 95 % | BODY MASS INDEX: 24.91 KG/M2 | HEART RATE: 106 BPM | WEIGHT: 155 LBS | TEMPERATURE: 98.6 F | DIASTOLIC BLOOD PRESSURE: 82 MMHG

## 2021-08-25 DIAGNOSIS — R00.0 TACHYCARDIA, UNSPECIFIED: ICD-10-CM

## 2021-08-25 DIAGNOSIS — R06.00 DYSPNEA, UNSPECIFIED: ICD-10-CM

## 2021-08-25 PROCEDURE — 93000 ELECTROCARDIOGRAM COMPLETE: CPT

## 2021-08-25 PROCEDURE — 99215 OFFICE O/P EST HI 40 MIN: CPT

## 2021-08-25 RX ORDER — RITUXIMAB 10 MG/ML
INJECTION, SOLUTION INTRAVENOUS
Refills: 0 | Status: DISCONTINUED | COMMUNITY
End: 2021-08-25

## 2021-08-25 RX ORDER — CARVEDILOL 12.5 MG/1
12.5 TABLET, FILM COATED ORAL TWICE DAILY
Qty: 180 | Refills: 3 | Status: ACTIVE | COMMUNITY
Start: 2021-08-25 | End: 1900-01-01

## 2021-08-25 NOTE — CARDIOLOGY SUMMARY
[de-identified] : 8/25/2021  Sinus  Tachycardia \par -  Nonspecific T-abnormality. \par \par ABNORMAL \par  [___] : [unfilled]

## 2021-08-25 NOTE — REASON FOR VISIT
[FreeTextEntry1] : abnormal EKG [Initial Evaluation] : an initial evaluation of [FreeTextEntry2] : abnormal EKG

## 2021-08-25 NOTE — PHYSICAL EXAM
[General Appearance - Well Developed] : well developed [Normal Appearance] : normal appearance [Well Groomed] : well groomed [General Appearance - Well Nourished] : well nourished [No Deformities] : no deformities [General Appearance - In No Acute Distress] : no acute distress [Normal Conjunctiva] : the conjunctiva exhibited no abnormalities [Eyelids - No Xanthelasma] : the eyelids demonstrated no xanthelasmas [Normal Oral Mucosa] : normal oral mucosa [No Oral Pallor] : no oral pallor [No Oral Cyanosis] : no oral cyanosis [Normal Jugular Venous A Waves Present] : normal jugular venous A waves present [Normal Jugular Venous V Waves Present] : normal jugular venous V waves present [No Jugular Venous Batista A Waves] : no jugular venous batista A waves [Heart Rate And Rhythm] : heart rate and rhythm were normal [Heart Sounds] : normal S1 and S2 [Murmurs] : no murmurs present [Respiration, Rhythm And Depth] : normal respiratory rhythm and effort [Exaggerated Use Of Accessory Muscles For Inspiration] : no accessory muscle use [Auscultation Breath Sounds / Voice Sounds] : lungs were clear to auscultation bilaterally [Abdomen Soft] : soft [Abdomen Tenderness] : non-tender [Abdomen Mass (___ Cm)] : no abdominal mass palpated [Abnormal Walk] : normal gait [Gait - Sufficient For Exercise Testing] : the gait was sufficient for exercise testing [Nail Clubbing] : no clubbing of the fingernails [Cyanosis, Localized] : no localized cyanosis [Petechial Hemorrhages (___cm)] : no petechial hemorrhages [Skin Color & Pigmentation] : normal skin color and pigmentation [] : no rash [No Venous Stasis] : no venous stasis [Skin Lesions] : no skin lesions [No Skin Ulcers] : no skin ulcer [No Xanthoma] : no  xanthoma was observed [Oriented To Time, Place, And Person] : oriented to person, place, and time [Affect] : the affect was normal [Mood] : the mood was normal [No Anxiety] : not feeling anxious

## 2021-08-25 NOTE — DISCUSSION/SUMMARY
[Patient] : the patient [Risks] : risks [Benefits] : benefits [Alternatives] : alternatives [With Me] : with me [___ Week(s)] : in [unfilled] week(s) [FreeTextEntry1] : This is a 54 year old woman with dyslipidemia and hypertension and pre diabetes, Rheumatoid arthritis, , here for abnormal EKG and Sinus tachy\par \par 1) Abnormal KEG: and dyspnea: nuclear stress test. 2D echo.  \par 2) sinus tachy: hydration/ .  . d/c losartan . add coreg 12.5 mg BID \par 3) syncope:resolved. \par

## 2021-08-25 NOTE — HISTORY OF PRESENT ILLNESS
[FreeTextEntry1] : abnormal EKG\par \par HPI for today: :  she feels good. no chest pain. no dyspnea.  no dizziness. she has fast heart beat . so she is concerned .  her heart does race at night time. she does no thave new symptoms\par she does not pay attention she did not do stress test. \par \par \par old nopte: This is a 54 year old woman with history of dyslipidemia and hypertension , pre diabetes,  Rheumatoid arthritis, here for ECG abnormalities. \par shortness of breathe .  on exrertion. whern she does heavy exertion. last year. not getting worse. \par no chest pain. \par at night she gets palpitations.  she takes deep breath it slows down. \par  syncope: 2009.  at her [facility]. \par second time she did when she was getting epidural injection in her neck 3 years ago.

## 2021-09-21 ENCOUNTER — APPOINTMENT (OUTPATIENT)
Dept: OBGYN | Facility: CLINIC | Age: 56
End: 2021-09-21
Payer: MEDICARE

## 2021-09-21 ENCOUNTER — LABORATORY RESULT (OUTPATIENT)
Age: 56
End: 2021-09-21

## 2021-09-21 VITALS
WEIGHT: 155 LBS | TEMPERATURE: 98 F | DIASTOLIC BLOOD PRESSURE: 70 MMHG | HEIGHT: 66 IN | BODY MASS INDEX: 24.91 KG/M2 | SYSTOLIC BLOOD PRESSURE: 110 MMHG

## 2021-09-21 DIAGNOSIS — N95.0 POSTMENOPAUSAL BLEEDING: ICD-10-CM

## 2021-09-21 PROCEDURE — 58558Z: CUSTOM

## 2021-09-21 NOTE — ASSESSMENT
[FreeTextEntry1] : USING ASEPTIC TECHNIQUE AND A PARACERVICAL BLOCK, A HYSTEROSCOPY WAS PERFORMED USING THE FLEXIBLE  HYSTEROSCOPE. NORMAL SALINE WAS USED FOR A DISTENSION MEDIUM. one TUBAL OSTIA was VISUALIZED. THE ENDOMETRIAL CAVITY WAS ASSESSED. BIOPSY OBTAINED. AFTER CARE INSTRUCTIONS AND RESTRICTIONS GIVEN. FOLLOWUP DISCUSSED

## 2021-09-21 NOTE — PROCEDURE
[Hysteroscopy] : Hysteroscopy [Time out performed] : Pre-procedure time out performed.  Patient's name, date of birth and procedure confirmed. [Consent Obtained] : Consent obtained [Postmenopausal bleeding] : postmenopausal bleeding [Risks] : risks [Benefits] : benefits [Alternatives] : alternatives [Patient] : patient [Infection] : infection [Bleeding] : bleeding [Allergic Reaction] : allergic reaction [Pretreatment with misoprostol] : pretreatment with misoprostol [Lidocaine___ mL] : [unfilled] ~UmL of lidocaine [flexible] : Using aseptic technique a hysteroscopy was performed using a flexible hysteroscope [Sent to Pathology] : specimen was placed in buffered formalin and sent for pathology [Antibiotics given] : antibiotics given [Hemostasis obtained] : hemostasis obtained [Tolerated Well] : Patient tolerated the procedure well [Aftercare instructions/regstrictions given and follow-up scheduled] : Aftercare instructions/restrictions given and follow-up scheduled [de-identified] : one tubal ostia visualized. irregular contours of the cavity. no obvious mass seen. no abnormal vasculature seen [de-identified] : doxycyline

## 2021-09-28 ENCOUNTER — APPOINTMENT (OUTPATIENT)
Dept: CARDIOLOGY | Facility: CLINIC | Age: 56
End: 2021-09-28
Payer: MEDICARE

## 2021-09-28 PROCEDURE — 93015 CV STRESS TEST SUPVJ I&R: CPT

## 2021-09-28 PROCEDURE — 93306 TTE W/DOPPLER COMPLETE: CPT

## 2021-09-28 PROCEDURE — 78452 HT MUSCLE IMAGE SPECT MULT: CPT

## 2021-09-28 PROCEDURE — A9500: CPT

## 2021-10-06 ENCOUNTER — APPOINTMENT (OUTPATIENT)
Dept: CARDIOLOGY | Facility: CLINIC | Age: 56
End: 2021-10-06
Payer: MEDICARE

## 2021-10-06 ENCOUNTER — NON-APPOINTMENT (OUTPATIENT)
Age: 56
End: 2021-10-06

## 2021-10-06 VITALS
SYSTOLIC BLOOD PRESSURE: 118 MMHG | HEART RATE: 84 BPM | WEIGHT: 153 LBS | DIASTOLIC BLOOD PRESSURE: 70 MMHG | HEIGHT: 66 IN | OXYGEN SATURATION: 97 % | TEMPERATURE: 98.2 F | BODY MASS INDEX: 24.59 KG/M2

## 2021-10-06 DIAGNOSIS — I10 ESSENTIAL (PRIMARY) HYPERTENSION: ICD-10-CM

## 2021-10-06 DIAGNOSIS — R94.31 ABNORMAL ELECTROCARDIOGRAM [ECG] [EKG]: ICD-10-CM

## 2021-10-06 PROCEDURE — 93000 ELECTROCARDIOGRAM COMPLETE: CPT

## 2021-10-06 PROCEDURE — 99214 OFFICE O/P EST MOD 30 MIN: CPT

## 2021-10-06 RX ORDER — TOCOPHERSOLAN (VITAMIN E TPGS) 400/15ML
LIQUID (ML) ORAL
Refills: 0 | Status: DISCONTINUED | COMMUNITY
End: 2021-10-06

## 2021-10-06 RX ORDER — CONJUGATED ESTROGENS 0.62 MG/G
0.62 CREAM VAGINAL
Qty: 1 | Refills: 3 | Status: DISCONTINUED | COMMUNITY
Start: 2021-06-05 | End: 2021-10-06

## 2021-10-06 RX ORDER — MISOPROSTOL 200 UG/1
200 TABLET ORAL
Qty: 2 | Refills: 0 | Status: DISCONTINUED | COMMUNITY
Start: 2021-08-20 | End: 2021-10-06

## 2021-10-06 RX ORDER — DOXYCYCLINE HYCLATE 100 MG/1
100 TABLET ORAL TWICE DAILY
Qty: 6 | Refills: 0 | Status: DISCONTINUED | COMMUNITY
Start: 2021-09-21 | End: 2021-10-06

## 2021-10-20 ENCOUNTER — APPOINTMENT (OUTPATIENT)
Dept: RHEUMATOLOGY | Facility: CLINIC | Age: 56
End: 2021-10-20
Payer: MEDICARE

## 2021-10-20 VITALS
HEIGHT: 66 IN | OXYGEN SATURATION: 98 % | WEIGHT: 153 LBS | SYSTOLIC BLOOD PRESSURE: 110 MMHG | DIASTOLIC BLOOD PRESSURE: 80 MMHG | BODY MASS INDEX: 24.59 KG/M2 | HEART RATE: 91 BPM | RESPIRATION RATE: 17 BRPM | TEMPERATURE: 98 F

## 2021-10-20 PROCEDURE — 99214 OFFICE O/P EST MOD 30 MIN: CPT

## 2021-10-20 RX ORDER — FOLIC ACID 1 MG/1
1 TABLET ORAL
Qty: 90 | Refills: 1 | Status: ACTIVE | COMMUNITY
Start: 1900-01-01 | End: 1900-01-01

## 2021-10-20 NOTE — HISTORY OF PRESENT ILLNESS
[FreeTextEntry1] : Pt presenting today for a f.u visit. Off leflunomide- as makes her depressed, very tearful.  Currently taking cyclobenzaprine 10 mg daily prn for pain. \par On RTX 1000mg IV q 6 months. Last RTX infusion 08/2021. Hx of +Hep B core AB. Evaluated by GI in the past and recommended prophylactic treatment as she has moderate risk for reactivation but pt declined. \par Today with no significant joint pain/swelling/stiffness to the joints. \par denies fever, chills, denies chest pain, chest palpitations, denies sob, denies nausea, vomiting, abdominal pain, denies rashes

## 2021-10-20 NOTE — ASSESSMENT
[FreeTextEntry1] : 56 year old female presents today for an f.u visit. Previously following Dr. Meza. Dx with RA and fibromyalgia. Previously on treatment with multiple agents- doesn’t remember all names ( treated with MTX, humira, Actemra, etc). Currently on RTX 1000 mg IV q 6 months x 2 years now. Recently started on leflunomide 10mg every other daily, cyclobenzaprine 10 mg daily. \par \par Off  leflunomide now as makes her depressed. \par \par Last RTX infusion 08/2021\par Hx of +Hep B core AB. Evaluated by GI in the past and recommended prophylactic treatment as she has moderate risk for reactivation but pt declined. \par \par - +Hep B core AB\par - Quantiferon: negative\par - c/w RTX 1000mg IV q 6 months, next RTX 02/2022.\par - c/w cyclobenzaprine 10 mg daily\par - c/w pain management f.u\par - reviewed risk and benefits of medications. Pt understands the risk of RTX. She understands that she should consider starting  prophylactic treatment. She is at risk for reactivation and decimination of Hep B. \par \par Discussed treatment plan with the patient. The patient was given the opportunity to ask questions and all questions were answered to their satisfaction.\par

## 2021-10-20 NOTE — PHYSICAL EXAM
[General Appearance - Alert] : alert [General Appearance - In No Acute Distress] : in no acute distress [General Appearance - Well Nourished] : well nourished [General Appearance - Well Developed] : well developed [Sclera] : the sclera and conjunctiva were normal [PERRL With Normal Accommodation] : pupils were equal in size, round, and reactive to light [Extraocular Movements] : extraocular movements were intact [Outer Ear] : the ears and nose were normal in appearance [Neck Appearance] : the appearance of the neck was normal [Jugular Venous Distention Increased] : there was no jugular-venous distention [Auscultation Breath Sounds / Voice Sounds] : lungs were clear to auscultation bilaterally [Heart Sounds] : normal S1 and S2 [Heart Rate And Rhythm] : heart rate was normal and rhythm regular [Heart Sounds Gallop] : no gallops [Murmurs] : no murmurs [Heart Sounds Pericardial Friction Rub] : no pericardial rub [Bowel Sounds] : normal bowel sounds [Abdomen Soft] : soft [Abdomen Tenderness] : non-tender [No CVA Tenderness] : no ~M costovertebral angle tenderness [No Spinal Tenderness] : no spinal tenderness [Involuntary Movements] : no involuntary movements were seen [Musculoskeletal - Swelling] : no joint swelling seen [] : no rash [Skin Lesions] : no skin lesions [Sensation] : the sensory exam was normal to light touch and pinprick [Motor Exam] : the motor exam was normal [No Focal Deficits] : no focal deficits [Oriented To Time, Place, And Person] : oriented to person, place, and time [Impaired Insight] : insight and judgment were intact [Affect] : the affect was normal [Mood] : the mood was normal

## 2021-11-02 ENCOUNTER — NON-APPOINTMENT (OUTPATIENT)
Age: 56
End: 2021-11-02

## 2022-01-12 NOTE — PROGRESS NOTE ADULT - PROVIDER SPECIALTY LIST ADULT
Surgery 63M PMHx VAD placement history of COVID infection in April 2020, history of a prolonged hospitalization in 2021 with recurrent sepsis, pneumonia, intubated/trach x2 cycles , chronic gall bladder disease s/p perc dawn, SMA ischemia requiring a stent placement, cachexia who was discharged to rehab but is presented from [facility] on 12/13 with AMS, hypotensive, tachycardic found to be COVID (+). Pt lethargic and mumbling words required ICU admission and pressor support now titrated off and downgraded to the floor. Awaiting dispo.

## 2022-01-19 ENCOUNTER — APPOINTMENT (OUTPATIENT)
Dept: RHEUMATOLOGY | Facility: CLINIC | Age: 57
End: 2022-01-19
Payer: MEDICARE

## 2022-01-19 ENCOUNTER — APPOINTMENT (OUTPATIENT)
Dept: RHEUMATOLOGY | Facility: CLINIC | Age: 57
End: 2022-01-19

## 2022-01-19 PROCEDURE — 99214 OFFICE O/P EST MOD 30 MIN: CPT | Mod: 95

## 2022-01-19 NOTE — HISTORY OF PRESENT ILLNESS
[Home] : at home, [unfilled] , at the time of the visit. [Medical Office: ([facility])___] : at the medical office located in  [Verbal consent obtained from patient] : the patient, [unfilled] [FreeTextEntry1] : Pt presenting today for a f.u visit. \par Currently taking cyclobenzaprine 10 mg daily prn for pain. \par On RTX 1000mg IV q 6 months. Last RTX infusion 08/2021. Hx of +Hep B core AB. Evaluated by GI in the past and recommended prophylactic treatment as she has moderate risk for reactivation but pt declined.  Due for next RTX infusion. \par Today with no significant joint pain/swelling/stiffness to the joints. \par denies fever, chills, denies chest pain, chest palpitations, denies sob, denies nausea, vomiting, abdominal pain, denies rashes

## 2022-01-19 NOTE — ASSESSMENT
[FreeTextEntry1] : 56 year old female presents today for an f.u visit. Previously following Dr. Meza. Dx with RA and fibromyalgia. Previously on treatment with multiple agents- doesn’t remember all names ( treated with MTX, humira, Actemra, etc). Currently on RTX 1000 mg IV q 6 months x 2 years now. Recently started on leflunomide 10mg every other daily, cyclobenzaprine 10 mg daily. \par \par Off  leflunomide now as makes her depressed. \par \par Last RTX infusion 08/2021\par Hx of +Hep B core AB. Evaluated by GI in the past and recommended prophylactic treatment as she has moderate risk for reactivation but pt declined. \par \par - repeat labs\par - +Hep B core AB\par - Quantiferon: negative\par - c/w RTX 1000mg IV q 6 months, next RTX 02/2022.\par - c/w cyclobenzaprine 10 mg daily\par - c/w pain management f.u\par - reviewed risk and benefits of medications. Pt understands the risk of RTX. She understands that she should consider starting  prophylactic treatment. She is at risk for reactivation and dissemination of Hep B. \par \par Discussed treatment plan with the patient. The patient was given the opportunity to ask questions and all questions were answered to their satisfaction.\par

## 2022-02-01 LAB
25(OH)D3 SERPL-MCNC: 48.1 NG/ML
ALBUMIN SERPL ELPH-MCNC: 4.4 G/DL
ALP BLD-CCNC: 96 U/L
ALT SERPL-CCNC: 22 U/L
ANION GAP SERPL CALC-SCNC: 17 MMOL/L
AST SERPL-CCNC: 18 U/L
BASOPHILS # BLD AUTO: 0.01 K/UL
BASOPHILS NFR BLD AUTO: 0.1 %
BILIRUB SERPL-MCNC: 0.3 MG/DL
BUN SERPL-MCNC: 12 MG/DL
CALCIUM SERPL-MCNC: 9.4 MG/DL
CCP AB SER IA-ACNC: <8 UNITS
CELLS.CD3-CD19+/CELLS IN BLOOD: <1 %
CHLORIDE SERPL-SCNC: 99 MMOL/L
CK SERPL-CCNC: 44 U/L
CO2 SERPL-SCNC: 23 MMOL/L
CREAT SERPL-MCNC: 0.76 MG/DL
CRP SERPL-MCNC: <3 MG/L
EOSINOPHIL # BLD AUTO: 0.01 K/UL
EOSINOPHIL NFR BLD AUTO: 0.1 %
ERYTHROCYTE [SEDIMENTATION RATE] IN BLOOD BY WESTERGREN METHOD: 13 MM/HR
GLUCOSE SERPL-MCNC: 176 MG/DL
HCT VFR BLD CALC: 42.1 %
HGB BLD-MCNC: 12.9 G/DL
IMM GRANULOCYTES NFR BLD AUTO: 0.5 %
LYMPHOCYTES # BLD AUTO: 1.57 K/UL
LYMPHOCYTES NFR BLD AUTO: 10.5 %
MAN DIFF?: NORMAL
MCHC RBC-ENTMCNC: 22.2 PG
MCHC RBC-ENTMCNC: 30.6 GM/DL
MCV RBC AUTO: 72.6 FL
MONOCYTES # BLD AUTO: 0.41 K/UL
MONOCYTES NFR BLD AUTO: 2.7 %
NEUTROPHILS # BLD AUTO: 12.84 K/UL
NEUTROPHILS NFR BLD AUTO: 86.1 %
PLATELET # BLD AUTO: 304 K/UL
POTASSIUM SERPL-SCNC: 4.2 MMOL/L
PROT SERPL-MCNC: 6.9 G/DL
RBC # BLD: 5.8 M/UL
RBC # FLD: 15.5 %
RF+CCP IGG SER-IMP: NEGATIVE
RHEUMATOID FACT SER QL: <10 IU/ML
SODIUM SERPL-SCNC: 139 MMOL/L
TSH SERPL-ACNC: 0.19 UIU/ML
WBC # FLD AUTO: 14.91 K/UL

## 2022-02-22 ENCOUNTER — APPOINTMENT (OUTPATIENT)
Dept: GASTROENTEROLOGY | Facility: CLINIC | Age: 57
End: 2022-02-22
Payer: MEDICARE

## 2022-02-22 VITALS
HEIGHT: 66 IN | WEIGHT: 154 LBS | TEMPERATURE: 98.2 F | RESPIRATION RATE: 14 BRPM | HEART RATE: 94 BPM | BODY MASS INDEX: 24.75 KG/M2 | DIASTOLIC BLOOD PRESSURE: 82 MMHG | SYSTOLIC BLOOD PRESSURE: 118 MMHG | OXYGEN SATURATION: 98 %

## 2022-02-22 DIAGNOSIS — R76.8 OTHER SPECIFIED ABNORMAL IMMUNOLOGICAL FINDINGS IN SERUM: ICD-10-CM

## 2022-02-22 PROCEDURE — 99205 OFFICE O/P NEW HI 60 MIN: CPT

## 2022-02-22 RX ORDER — METHYLPREDNISOLONE 125 MG/2ML
125 INJECTION, POWDER, LYOPHILIZED, FOR SOLUTION INTRAMUSCULAR; INTRAVENOUS
Qty: 1 | Refills: 0 | Status: DISCONTINUED | COMMUNITY
Start: 2021-06-30 | End: 2022-02-22

## 2022-02-22 RX ORDER — LEFLUNOMIDE 10 MG/1
10 TABLET, FILM COATED ORAL
Qty: 90 | Refills: 1 | Status: DISCONTINUED | COMMUNITY
Start: 2021-01-27 | End: 2022-02-22

## 2022-02-22 RX ORDER — ACETAMINOPHEN 500 MG/1
500 TABLET ORAL
Qty: 2 | Refills: 0 | Status: DISCONTINUED | COMMUNITY
Start: 2021-06-30 | End: 2022-02-22

## 2022-02-28 ENCOUNTER — LABORATORY RESULT (OUTPATIENT)
Age: 57
End: 2022-02-28

## 2022-03-02 NOTE — ASSESSMENT
[FreeTextEntry1] : 56-year-old female patient with isolated hepatitis B core antibody positivity.\par Transaminases normal. Surface antigen negative.\par Discussed the dangers of reactivation/ reverse seroconversion of hepatitis B with her in detail.\par Discussed the critical importance of compliance with prophylactic antivirals for hepatitis B with the patient and the complications that can arise from noncompliance. \par Discussed the importance of continuing prophylactic HBV antiviral therapy 2 years after rituximab has been stopped.(Should the patient ever decide to go off rituximab though she states it is highly unlikely)\par  Patient has decided to proceed with initiation of prophylactic HBV therapy.\par I offered tenofovir alafenamide Vemlidy the patient wants to proceed with a more generic medication so I have ordered tenofovir.\par Ordered hepatitis B surface antigen and HBV DNA for today. Ultrasound ordered.\par Results will be communicated via phone.\par Follow-up with labs in 6 months. They have been ordered and have requested the patient to make sure that she get the labs today and also a week before the 6-month visit.  For the future she will follow up with us every 6 months with labs.\par \par \par Rituximab is, a major cause of reactivation of hepatitis B which typically causes acute hepatocellular injury that can be severe and lead to acute liver failure and death or need for emergency liver transplantation.Reactivation of HBV in persons who have resolved hepatitis B (anti-HBc without HBsAg in serum) is usually referred to as “reverse seroconversion” and reactivation in persons with preexisting HBsAg in serum as “typical” HBV reactivation.  The usual sequence of events is appearance of rising levels of HBV DNA in serum shortly after rituximab is started, followed by rise in levels of HBsAg and HBeAg. When therapy is stopped and immune reconstitution has begun, serum ALT and AST levels start to rise followed by symptoms and jaundice.\par \par  In general, patients with reverse seroconversion have received more rigorous immunosuppression, the latency until onset is longer, peak levels of HBV DNA are lower and the disease course is more severe in patients than in patients with typical reactivation. The time to appearance of clinically apparent reactivation tends to be 3 to 6 months in patients with typical reactivation, but 12 to 36 months in those with reverse seroconversion.Reactivation can be ameliorated by early intervention with oral antiviral therapy, but institution of therapy after appearance of clinical disease and jaundice may not be effective and many instances of fatal reactivation have occurred despite rapid institution of treatment with oral antiviral agents.\par Of note rituximab can reactivate other viral infections, and severe instances of acute hepatitis due to adenovirus, parvovirus and other opportunistic viral infections after rituximab therapy have been described.\par \par  Prophylaxis with a potent oral, antiviral agent effective against hepatitis B is recommended for all persons who have HBsAg in serum and is suggested for those with anti-HBc without HBsAg. An alternative approach is monthly monitoring for HBV DNA during therapy and early institution of antiviral therapy if levels rise. This approach, however, is problematic in that reactivation may occur late during chemotherapy or even after it is completed.Recommend to continue antivirals 2 years after stopping Rituxam. (Livertox).

## 2022-03-02 NOTE — PHYSICAL EXAM
[General Appearance - Alert] : alert [General Appearance - In No Acute Distress] : in no acute distress [General Appearance - Well Nourished] : well nourished [Scleral Icterus] : No Scleral Icterus [Spider Angioma] : No spider angioma(s) were observed [Abdominal Bruit] : no abdominal bruit [Abdominal  Ascites] : no ascites [Ascites Fluid Wave] : no ascites fluid wave [Ascites Tense] : ascites is not tense [Ascites Shifting Dullness] : no shifting dullness of ascites [Splenomegaly] : no splenomegaly [Caput Medusae] : no caput medusae observed

## 2022-03-02 NOTE — HISTORY OF PRESENT ILLNESS
[de-identified] : Very pleasant 56-year-old female with hypertension hyperlipidemia, rheumatoid arthritis, isolated hepatitis B core antibody positive managed on rituximab for the past 2 years was sent in for consideration of prophylactic hepatitis B virus antiviral therapy.\par Patient is hepatitis B surface antigen negative.\par Patient was born in [place] and other than that has no risk factors for HBV exposure.\par Most likely got it in childhood.\par LFTs are normal.  No symptoms of liver disease.\par Patient's rheumatoid arthritis was not controlled on various medications she was finally 2 years ago started on Rituxan IV 6 monthly.

## 2022-03-25 ENCOUNTER — NON-APPOINTMENT (OUTPATIENT)
Age: 57
End: 2022-03-25

## 2022-03-27 RX ORDER — ACETAMINOPHEN 500 MG/1
500 TABLET ORAL
Qty: 0 | Refills: 0 | Status: COMPLETED | OUTPATIENT
Start: 2022-03-25 | End: 1900-01-01

## 2022-03-27 RX ORDER — METHYLPREDNISOLONE 125 MG/2ML
125 INJECTION, POWDER, LYOPHILIZED, FOR SOLUTION INTRAMUSCULAR; INTRAVENOUS
Qty: 0 | Refills: 0 | Status: COMPLETED | OUTPATIENT
Start: 2022-03-25 | End: 1900-01-01

## 2022-03-27 RX ORDER — RITUXIMAB 10 MG/ML
500 INJECTION, SOLUTION INTRAVENOUS
Qty: 0 | Refills: 0 | Status: COMPLETED | OUTPATIENT
Start: 2022-03-25 | End: 1900-01-01

## 2022-03-27 RX ORDER — DIPHENHYDRAMINE HYDROCHLORIDE 50 MG/ML
50 INJECTION, SOLUTION INTRAMUSCULAR; INTRAVENOUS
Qty: 1 | Refills: 0 | Status: COMPLETED | OUTPATIENT
Start: 2022-03-25 | End: 1900-01-01

## 2022-04-05 ENCOUNTER — LABORATORY RESULT (OUTPATIENT)
Age: 57
End: 2022-04-05

## 2022-04-05 ENCOUNTER — APPOINTMENT (OUTPATIENT)
Dept: RHEUMATOLOGY | Facility: CLINIC | Age: 57
End: 2022-04-05
Payer: MEDICARE

## 2022-04-05 VITALS
TEMPERATURE: 97 F | HEART RATE: 80 BPM | OXYGEN SATURATION: 100 % | RESPIRATION RATE: 16 BRPM | SYSTOLIC BLOOD PRESSURE: 107 MMHG | DIASTOLIC BLOOD PRESSURE: 72 MMHG

## 2022-04-05 VITALS
SYSTOLIC BLOOD PRESSURE: 117 MMHG | DIASTOLIC BLOOD PRESSURE: 80 MMHG | HEART RATE: 69 BPM | OXYGEN SATURATION: 97 % | RESPIRATION RATE: 16 BRPM

## 2022-04-05 VITALS
OXYGEN SATURATION: 97 % | SYSTOLIC BLOOD PRESSURE: 114 MMHG | DIASTOLIC BLOOD PRESSURE: 81 MMHG | RESPIRATION RATE: 16 BRPM | HEART RATE: 68 BPM

## 2022-04-05 VITALS
HEART RATE: 78 BPM | DIASTOLIC BLOOD PRESSURE: 75 MMHG | OXYGEN SATURATION: 96 % | SYSTOLIC BLOOD PRESSURE: 111 MMHG | RESPIRATION RATE: 16 BRPM

## 2022-04-05 VITALS
RESPIRATION RATE: 16 BRPM | OXYGEN SATURATION: 98 % | HEART RATE: 97 BPM | SYSTOLIC BLOOD PRESSURE: 125 MMHG | DIASTOLIC BLOOD PRESSURE: 86 MMHG

## 2022-04-05 VITALS
HEART RATE: 78 BPM | RESPIRATION RATE: 16 BRPM | DIASTOLIC BLOOD PRESSURE: 70 MMHG | SYSTOLIC BLOOD PRESSURE: 104 MMHG | OXYGEN SATURATION: 97 %

## 2022-04-05 PROCEDURE — 96375 TX/PRO/DX INJ NEW DRUG ADDON: CPT

## 2022-04-05 PROCEDURE — 96413 CHEMO IV INFUSION 1 HR: CPT

## 2022-04-05 PROCEDURE — 96374 THER/PROPH/DIAG INJ IV PUSH: CPT | Mod: 59

## 2022-04-05 PROCEDURE — 96415 CHEMO IV INFUSION ADDL HR: CPT

## 2022-04-05 RX ORDER — ACETAMINOPHEN 500 MG/1
500 TABLET ORAL
Qty: 0 | Refills: 0 | Status: COMPLETED | OUTPATIENT
Start: 2022-03-30

## 2022-04-05 RX ORDER — DIPHENHYDRAMINE HYDROCHLORIDE 50 MG/ML
50 INJECTION, SOLUTION INTRAMUSCULAR; INTRAVENOUS
Qty: 1 | Refills: 0 | Status: COMPLETED | OUTPATIENT
Start: 2022-03-30

## 2022-04-05 RX ORDER — METHYLPREDNISOLONE 125 MG/2ML
125 INJECTION, POWDER, LYOPHILIZED, FOR SOLUTION INTRAMUSCULAR; INTRAVENOUS
Qty: 0 | Refills: 0 | Status: COMPLETED | OUTPATIENT
Start: 2022-03-30

## 2022-04-05 RX ORDER — RITUXIMAB 10 MG/ML
500 INJECTION, SOLUTION INTRAVENOUS
Qty: 0 | Refills: 0 | Status: COMPLETED | OUTPATIENT
Start: 2022-03-30

## 2022-04-19 ENCOUNTER — APPOINTMENT (OUTPATIENT)
Dept: RHEUMATOLOGY | Facility: CLINIC | Age: 57
End: 2022-04-19
Payer: MEDICARE

## 2022-04-19 VITALS
OXYGEN SATURATION: 97 % | DIASTOLIC BLOOD PRESSURE: 77 MMHG | RESPIRATION RATE: 16 BRPM | SYSTOLIC BLOOD PRESSURE: 111 MMHG | HEART RATE: 64 BPM

## 2022-04-19 VITALS
HEART RATE: 71 BPM | DIASTOLIC BLOOD PRESSURE: 78 MMHG | OXYGEN SATURATION: 98 % | RESPIRATION RATE: 16 BRPM | SYSTOLIC BLOOD PRESSURE: 116 MMHG

## 2022-04-19 VITALS
OXYGEN SATURATION: 98 % | SYSTOLIC BLOOD PRESSURE: 107 MMHG | DIASTOLIC BLOOD PRESSURE: 74 MMHG | HEART RATE: 65 BPM | RESPIRATION RATE: 16 BRPM

## 2022-04-19 VITALS
HEART RATE: 80 BPM | DIASTOLIC BLOOD PRESSURE: 78 MMHG | OXYGEN SATURATION: 99 % | SYSTOLIC BLOOD PRESSURE: 116 MMHG | RESPIRATION RATE: 16 BRPM

## 2022-04-19 VITALS
HEART RATE: 80 BPM | DIASTOLIC BLOOD PRESSURE: 76 MMHG | TEMPERATURE: 97.6 F | SYSTOLIC BLOOD PRESSURE: 111 MMHG | OXYGEN SATURATION: 100 % | RESPIRATION RATE: 16 BRPM

## 2022-04-19 PROCEDURE — 96375 TX/PRO/DX INJ NEW DRUG ADDON: CPT

## 2022-04-19 PROCEDURE — 96413 CHEMO IV INFUSION 1 HR: CPT

## 2022-04-19 PROCEDURE — 96374 THER/PROPH/DIAG INJ IV PUSH: CPT | Mod: 59

## 2022-04-19 PROCEDURE — 96415 CHEMO IV INFUSION ADDL HR: CPT

## 2022-04-19 RX ORDER — METHYLPREDNISOLONE 125 MG/2ML
125 INJECTION, POWDER, LYOPHILIZED, FOR SOLUTION INTRAMUSCULAR; INTRAVENOUS
Qty: 0 | Refills: 0 | Status: COMPLETED
Start: 2022-03-25

## 2022-04-19 RX ORDER — DIPHENHYDRAMINE HYDROCHLORIDE 50 MG/ML
50 INJECTION, SOLUTION INTRAMUSCULAR; INTRAVENOUS
Qty: 1 | Refills: 0 | Status: COMPLETED
Start: 2022-03-25

## 2022-04-19 RX ORDER — RITUXIMAB 10 MG/ML
500 INJECTION, SOLUTION INTRAVENOUS
Qty: 0 | Refills: 0 | Status: COMPLETED
Start: 2022-03-25

## 2022-04-19 RX ORDER — ACETAMINOPHEN 500 MG/1
500 TABLET ORAL
Qty: 0 | Refills: 0 | Status: COMPLETED
Start: 2022-03-25

## 2022-04-25 ENCOUNTER — APPOINTMENT (OUTPATIENT)
Dept: RHEUMATOLOGY | Facility: CLINIC | Age: 57
End: 2022-04-25
Payer: MEDICARE

## 2022-04-25 VITALS
TEMPERATURE: 97.9 F | HEART RATE: 80 BPM | RESPIRATION RATE: 17 BRPM | WEIGHT: 149 LBS | OXYGEN SATURATION: 98 % | BODY MASS INDEX: 23.95 KG/M2 | HEIGHT: 66 IN | SYSTOLIC BLOOD PRESSURE: 100 MMHG | DIASTOLIC BLOOD PRESSURE: 80 MMHG

## 2022-04-25 PROCEDURE — 99214 OFFICE O/P EST MOD 30 MIN: CPT

## 2022-04-25 NOTE — ASSESSMENT
[FreeTextEntry1] : 56 year old female presents today for an f.u visit. Previously following Dr. Meza. Dx with RA and fibromyalgia. Previously on treatment with multiple agents- doesn’t remember all names ( treated with MTX, humira, Actemra, etc). Currently on RTX 1000 mg IV q 6 months x 2 years now, cyclobenzaprine 10 mg daily. \par \par Off leflunomide now as makes her depressed\par Off tenofovir- developed severe n/v. \par \par Last RTX infusion 04/2022\par \par - repeat labs prior to f/u\par - +Hep B core AB. Hep f.u\par - Quantiferon: negative\par - c/w RTX 1000mg IV q 6 months, next RTX 10/2022.\par - c/w cyclobenzaprine 10 mg daily\par - c/w pain management f.u\par \par Discussed treatment plan with the patient. The patient was given the opportunity to ask questions and all questions were answered to their satisfaction.

## 2022-04-25 NOTE — HISTORY OF PRESENT ILLNESS
[FreeTextEntry1] : Pt presenting today for a f.u visit. Was started on Tenofovir for hepatitis B core antibody positivity- stopped as she developed severe n/v. \par Currently taking cyclobenzaprine 10 mg daily prn for pain, also using lidocaine cream. \par On RTX 1000mg IV q 6 months. Last RTX infusion 04/2022. \par Today with no significant joint pain/swelling/stiffness to the joints. \par denies fever, chills, denies chest pain, chest palpitations, denies sob, denies nausea, vomiting, abdominal pain, denies rashes

## 2022-04-25 NOTE — PHYSICAL EXAM
[General Appearance - Alert] : alert [General Appearance - In No Acute Distress] : in no acute distress [General Appearance - Well Nourished] : well nourished [General Appearance - Well Developed] : well developed [Sclera] : the sclera and conjunctiva were normal [PERRL With Normal Accommodation] : pupils were equal in size, round, and reactive to light [Extraocular Movements] : extraocular movements were intact [Outer Ear] : the ears and nose were normal in appearance [Neck Appearance] : the appearance of the neck was normal [Jugular Venous Distention Increased] : there was no jugular-venous distention [Auscultation Breath Sounds / Voice Sounds] : lungs were clear to auscultation bilaterally [Heart Rate And Rhythm] : heart rate was normal and rhythm regular [Heart Sounds] : normal S1 and S2 [Heart Sounds Gallop] : no gallops [Murmurs] : no murmurs [Heart Sounds Pericardial Friction Rub] : no pericardial rub [Bowel Sounds] : normal bowel sounds [Abdomen Soft] : soft [Abdomen Tenderness] : non-tender [No CVA Tenderness] : no ~M costovertebral angle tenderness [No Spinal Tenderness] : no spinal tenderness [Involuntary Movements] : no involuntary movements were seen [Musculoskeletal - Swelling] : no joint swelling seen [] : no rash [Skin Lesions] : no skin lesions [Sensation] : the sensory exam was normal to light touch and pinprick [Motor Exam] : the motor exam was normal [No Focal Deficits] : no focal deficits [Oriented To Time, Place, And Person] : oriented to person, place, and time [Impaired Insight] : insight and judgment were intact [Affect] : the affect was normal [Mood] : the mood was normal

## 2022-05-13 ENCOUNTER — APPOINTMENT (OUTPATIENT)
Dept: ORTHOPEDIC SURGERY | Facility: CLINIC | Age: 57
End: 2022-05-13
Payer: MEDICARE

## 2022-05-13 VITALS
WEIGHT: 150 LBS | TEMPERATURE: 98.1 F | SYSTOLIC BLOOD PRESSURE: 129 MMHG | DIASTOLIC BLOOD PRESSURE: 89 MMHG | HEART RATE: 81 BPM | BODY MASS INDEX: 24.11 KG/M2 | HEIGHT: 66 IN

## 2022-05-13 PROCEDURE — 20550 NJX 1 TENDON SHEATH/LIGAMENT: CPT | Mod: F7

## 2022-05-13 PROCEDURE — 99204 OFFICE O/P NEW MOD 45 MIN: CPT | Mod: 25

## 2022-06-28 ENCOUNTER — LABORATORY RESULT (OUTPATIENT)
Age: 57
End: 2022-06-28

## 2022-06-28 ENCOUNTER — APPOINTMENT (OUTPATIENT)
Dept: RHEUMATOLOGY | Facility: CLINIC | Age: 57
End: 2022-06-28

## 2022-06-28 VITALS
SYSTOLIC BLOOD PRESSURE: 130 MMHG | TEMPERATURE: 98.4 F | WEIGHT: 150 LBS | DIASTOLIC BLOOD PRESSURE: 90 MMHG | BODY MASS INDEX: 24.11 KG/M2 | RESPIRATION RATE: 17 BRPM | OXYGEN SATURATION: 99 % | HEIGHT: 66 IN | HEART RATE: 82 BPM

## 2022-06-28 PROCEDURE — 99215 OFFICE O/P EST HI 40 MIN: CPT

## 2022-06-28 RX ORDER — TENOFOVIR DISOPROXIL FUMARATE 300 MG/1
300 TABLET ORAL DAILY
Qty: 90 | Refills: 2 | Status: DISCONTINUED | COMMUNITY
Start: 2022-02-22 | End: 2022-06-28

## 2022-06-29 NOTE — HISTORY OF PRESENT ILLNESS
[FreeTextEntry1] : Pt presenting today for a f.u visit. \par Doing well overall. \par Currently taking cyclobenzaprine 10 mg daily prn for pain, also using lidocaine cream. \par On RTX 1000mg IV q 6 months. Last RTX infusion 04/2022. \par Today with no significant joint pain/swelling/stiffness to the joints. \par denies fever, chills, denies chest pain, chest palpitations, denies sob, denies nausea, vomiting, abdominal pain, denies rashes.\par Evaluated by ortho hand. Chart reviewed.

## 2022-06-29 NOTE — ASSESSMENT
[FreeTextEntry1] : 56 year old female presents today for an f.u visit. Previously following Dr. Meza. Dx with RA and fibromyalgia. Previously on treatment with multiple agents- doesn’t remember all names ( treated with MTX, humira, Actemra, etc). Currently on RTX 1000 mg IV q 6 months x 2 years now, cyclobenzaprine 10 mg daily. \par \par Off leflunomide now as makes her depressed\par Off tenofovir- developed severe n/v. \par \par Last RTX infusion 04/2022\par \par - repeat labs \par - +Hep B core AB. Hep f.u. Does not wish to be treated further. She understands that she should consider starting prophylactic treatment. She is at risk for reactivation/ dissemination of Hep B. \par - Quantiferon: negative\par - c/w RTX 1000mg IV q 6 months, next RTX 10/2022.\par - c/w cyclobenzaprine 10 mg daily\par - c/w topical lidocaine 5% prn for pain\par - c/w pain management f.u\par \par Discussed treatment plan with the patient. The patient was given the opportunity to ask questions and all questions were answered to their satisfaction.

## 2022-08-22 ENCOUNTER — APPOINTMENT (OUTPATIENT)
Dept: GASTROENTEROLOGY | Facility: CLINIC | Age: 57
End: 2022-08-22

## 2022-08-24 LAB
ALBUMIN SERPL ELPH-MCNC: 4.2 G/DL
ALP BLD-CCNC: 89 U/L
ALT SERPL-CCNC: 20 U/L
ANION GAP SERPL CALC-SCNC: 8 MMOL/L
AST SERPL-CCNC: 22 U/L
BASOPHILS # BLD AUTO: 0.03 K/UL
BASOPHILS NFR BLD AUTO: 0.5 %
BILIRUB SERPL-MCNC: 0.2 MG/DL
BUN SERPL-MCNC: 15 MG/DL
CALCIUM SERPL-MCNC: 9.2 MG/DL
CHLORIDE SERPL-SCNC: 103 MMOL/L
CK SERPL-CCNC: 46 U/L
CO2 SERPL-SCNC: 29 MMOL/L
CREAT SERPL-MCNC: 0.72 MG/DL
CRP SERPL-MCNC: <3 MG/L
EGFR: 97 ML/MIN/1.73M2
EOSINOPHIL # BLD AUTO: 0.13 K/UL
EOSINOPHIL NFR BLD AUTO: 2.4 %
ERYTHROCYTE [SEDIMENTATION RATE] IN BLOOD BY WESTERGREN METHOD: 35 MM/HR
GLUCOSE SERPL-MCNC: 105 MG/DL
HCT VFR BLD CALC: 39.4 %
HGB BLD-MCNC: 12 G/DL
IMM GRANULOCYTES NFR BLD AUTO: 0.4 %
LYMPHOCYTES # BLD AUTO: 1.67 K/UL
LYMPHOCYTES NFR BLD AUTO: 30.3 %
MAN DIFF?: NORMAL
MCHC RBC-ENTMCNC: 22 PG
MCHC RBC-ENTMCNC: 30.5 GM/DL
MCV RBC AUTO: 72.2 FL
MONOCYTES # BLD AUTO: 0.34 K/UL
MONOCYTES NFR BLD AUTO: 6.2 %
NEUTROPHILS # BLD AUTO: 3.32 K/UL
NEUTROPHILS NFR BLD AUTO: 60.2 %
PLATELET # BLD AUTO: 252 K/UL
POTASSIUM SERPL-SCNC: 4.3 MMOL/L
PROT SERPL-MCNC: 6.4 G/DL
RBC # BLD: 5.46 M/UL
RBC # FLD: 15 %
SODIUM SERPL-SCNC: 140 MMOL/L
TSH SERPL-ACNC: 4.25 UIU/ML
WBC # FLD AUTO: 5.51 K/UL

## 2022-08-31 ENCOUNTER — APPOINTMENT (OUTPATIENT)
Dept: ORTHOPEDIC SURGERY | Facility: CLINIC | Age: 57
End: 2022-08-31

## 2022-08-31 VITALS
WEIGHT: 150 LBS | DIASTOLIC BLOOD PRESSURE: 90 MMHG | SYSTOLIC BLOOD PRESSURE: 129 MMHG | HEIGHT: 66 IN | HEART RATE: 83 BPM | BODY MASS INDEX: 24.11 KG/M2

## 2022-08-31 DIAGNOSIS — M65.331 TRIGGER FINGER, RIGHT MIDDLE FINGER: ICD-10-CM

## 2022-08-31 PROCEDURE — 99214 OFFICE O/P EST MOD 30 MIN: CPT

## 2022-10-04 ENCOUNTER — APPOINTMENT (OUTPATIENT)
Dept: RHEUMATOLOGY | Facility: CLINIC | Age: 57
End: 2022-10-04

## 2022-10-04 VITALS
SYSTOLIC BLOOD PRESSURE: 130 MMHG | OXYGEN SATURATION: 99 % | TEMPERATURE: 97.3 F | HEART RATE: 72 BPM | DIASTOLIC BLOOD PRESSURE: 80 MMHG

## 2022-10-04 PROCEDURE — 99214 OFFICE O/P EST MOD 30 MIN: CPT

## 2022-10-04 NOTE — ASSESSMENT
[FreeTextEntry1] : 57 year old female presents today for an f.u visit. Previously following Dr. Meza. Dx with RA and fibromyalgia. Previously on treatment with multiple agents- doesn’t remember all names ( treated with MTX, humira, Actemra, etc). Currently on RTX 1000 mg IV q 6 months x 2 years now, cyclobenzaprine 10 mg daily. \par \par Off leflunomide now as makes her depressed\par Off tenofovir- developed severe n/v. \par \par Last RTX infusion 04/2022\par \par - repeat labs \par - +Hep B core AB. Hep f.u. Does not wish to be treated further. She understands that she should consider starting prophylactic treatment. She is at risk for reactivation/ dissemination of Hep B. \par - Quantiferon: negative\par - c/w RTX 1000mg IV q 6 months, next RTX 10/2022.\par - c/w cyclobenzaprine 10 mg daily\par - c/w topical lidocaine 5% prn for pain\par - c/w pain management f.u\par - OT\par \par Discussed treatment plan with the patient. The patient was given the opportunity to ask questions and all questions were answered to their satisfaction.

## 2022-10-04 NOTE — HISTORY OF PRESENT ILLNESS
[FreeTextEntry1] : Pt presenting today for a f.u visit. Doing well overall. \par Currently taking cyclobenzaprine 10 mg daily prn for pain, also using lidocaine cream. \par On RTX 1000mg IV q 6 months. Last RTX infusion 04/2022. Due for infusion now. \par Today with no significant joint pain/swelling/stiffness to the joints. \par denies fever, chills, denies chest pain, chest palpitations, denies sob, denies nausea, vomiting, abdominal pain, denies rashes.\par Evaluated by ortho hand. Chart reviewed.

## 2022-10-10 LAB
CCP AB SER IA-ACNC: <8 UNITS
CK SERPL-CCNC: 50 U/L
CRP SERPL-MCNC: <3 MG/L
ERYTHROCYTE [SEDIMENTATION RATE] IN BLOOD BY WESTERGREN METHOD: 21 MM/HR
HBV CORE IGM SER QL: NONREACTIVE
HBV E AB SER QL: NONREACTIVE
HBV E AG SER QL: NONREACTIVE
HBV SURFACE AB SER QL: REACTIVE
HBV SURFACE AB SERPL IA-ACNC: >1000 MIU/ML
HBV SURFACE AG SER QL: NONREACTIVE
HBV SURFACE AG SERPL QL IA: NONREACTIVE
HEPB DNA PCR INT: NOT DETECTED
HEPB DNA PCR LOG: NOT DETECTED LOGIU/ML
RF+CCP IGG SER-IMP: NEGATIVE
RHEUMATOID FACT SER QL: <10 IU/ML
TSH SERPL-ACNC: 1.94 UIU/ML

## 2022-10-12 ENCOUNTER — APPOINTMENT (OUTPATIENT)
Dept: CARDIOLOGY | Facility: CLINIC | Age: 57
End: 2022-10-12

## 2022-10-24 ENCOUNTER — APPOINTMENT (OUTPATIENT)
Dept: RHEUMATOLOGY | Facility: CLINIC | Age: 57
End: 2022-10-24

## 2022-10-24 ENCOUNTER — MED ADMIN CHARGE (OUTPATIENT)
Age: 57
End: 2022-10-24

## 2022-10-24 VITALS
HEART RATE: 77 BPM | RESPIRATION RATE: 18 BRPM | DIASTOLIC BLOOD PRESSURE: 71 MMHG | OXYGEN SATURATION: 98 % | SYSTOLIC BLOOD PRESSURE: 102 MMHG | TEMPERATURE: 97.7 F

## 2022-10-24 VITALS
DIASTOLIC BLOOD PRESSURE: 77 MMHG | RESPIRATION RATE: 18 BRPM | TEMPERATURE: 98 F | SYSTOLIC BLOOD PRESSURE: 110 MMHG | HEART RATE: 79 BPM | OXYGEN SATURATION: 97 %

## 2022-10-24 VITALS
RESPIRATION RATE: 18 BRPM | TEMPERATURE: 97.5 F | SYSTOLIC BLOOD PRESSURE: 107 MMHG | DIASTOLIC BLOOD PRESSURE: 72 MMHG | HEART RATE: 76 BPM | OXYGEN SATURATION: 100 %

## 2022-10-24 VITALS
RESPIRATION RATE: 18 BRPM | TEMPERATURE: 97.8 F | OXYGEN SATURATION: 98 % | SYSTOLIC BLOOD PRESSURE: 120 MMHG | DIASTOLIC BLOOD PRESSURE: 82 MMHG | HEART RATE: 86 BPM

## 2022-10-24 VITALS
SYSTOLIC BLOOD PRESSURE: 117 MMHG | DIASTOLIC BLOOD PRESSURE: 74 MMHG | OXYGEN SATURATION: 100 % | TEMPERATURE: 98 F | RESPIRATION RATE: 18 BRPM | HEART RATE: 69 BPM

## 2022-10-24 VITALS
OXYGEN SATURATION: 100 % | DIASTOLIC BLOOD PRESSURE: 86 MMHG | HEART RATE: 63 BPM | SYSTOLIC BLOOD PRESSURE: 128 MMHG | RESPIRATION RATE: 18 BRPM | TEMPERATURE: 97.6 F

## 2022-10-24 PROCEDURE — 96415 CHEMO IV INFUSION ADDL HR: CPT

## 2022-10-24 PROCEDURE — 96375 TX/PRO/DX INJ NEW DRUG ADDON: CPT

## 2022-10-24 PROCEDURE — 96413 CHEMO IV INFUSION 1 HR: CPT

## 2022-10-24 PROCEDURE — 96374 THER/PROPH/DIAG INJ IV PUSH: CPT | Mod: 59

## 2022-10-24 RX ORDER — METHYLPREDNISOLONE 125 MG/2ML
125 INJECTION, POWDER, LYOPHILIZED, FOR SOLUTION INTRAMUSCULAR; INTRAVENOUS
Qty: 0 | Refills: 0 | Status: COMPLETED | OUTPATIENT
Start: 2022-10-11

## 2022-10-24 RX ORDER — RITUXIMAB 10 MG/ML
500 INJECTION, SOLUTION INTRAVENOUS
Qty: 0 | Refills: 0 | Status: COMPLETED | OUTPATIENT
Start: 2022-10-11

## 2022-10-24 RX ORDER — ACETAMINOPHEN 500 MG/1
500 TABLET ORAL
Qty: 0 | Refills: 0 | Status: COMPLETED | OUTPATIENT
Start: 2022-10-11

## 2022-10-24 RX ORDER — DIPHENHYDRAMINE HYDROCHLORIDE 50 MG/ML
50 INJECTION, SOLUTION INTRAMUSCULAR; INTRAVENOUS
Qty: 1 | Refills: 0 | Status: COMPLETED | OUTPATIENT
Start: 2022-10-11

## 2022-11-09 ENCOUNTER — APPOINTMENT (OUTPATIENT)
Dept: RHEUMATOLOGY | Facility: CLINIC | Age: 57
End: 2022-11-09

## 2022-11-09 VITALS
DIASTOLIC BLOOD PRESSURE: 87 MMHG | HEART RATE: 90 BPM | RESPIRATION RATE: 16 BRPM | SYSTOLIC BLOOD PRESSURE: 129 MMHG | OXYGEN SATURATION: 90 %

## 2022-11-09 VITALS
TEMPERATURE: 97.6 F | DIASTOLIC BLOOD PRESSURE: 78 MMHG | OXYGEN SATURATION: 98 % | SYSTOLIC BLOOD PRESSURE: 117 MMHG | HEART RATE: 74 BPM | RESPIRATION RATE: 18 BRPM

## 2022-11-09 VITALS
DIASTOLIC BLOOD PRESSURE: 86 MMHG | RESPIRATION RATE: 16 BRPM | SYSTOLIC BLOOD PRESSURE: 127 MMHG | OXYGEN SATURATION: 97 % | TEMPERATURE: 98 F | HEART RATE: 68 BPM

## 2022-11-09 PROCEDURE — 96413 CHEMO IV INFUSION 1 HR: CPT

## 2022-11-09 PROCEDURE — 96374 THER/PROPH/DIAG INJ IV PUSH: CPT | Mod: 59

## 2022-11-09 PROCEDURE — 96375 TX/PRO/DX INJ NEW DRUG ADDON: CPT | Mod: 59

## 2022-11-09 PROCEDURE — 96415 CHEMO IV INFUSION ADDL HR: CPT

## 2022-11-09 RX ORDER — METHYLPREDNISOLONE 125 MG/2ML
125 INJECTION, POWDER, LYOPHILIZED, FOR SOLUTION INTRAMUSCULAR; INTRAVENOUS
Qty: 0 | Refills: 0 | Status: COMPLETED | OUTPATIENT
Start: 2022-11-02

## 2022-11-09 RX ORDER — DIPHENHYDRAMINE HYDROCHLORIDE 50 MG/ML
50 INJECTION, SOLUTION INTRAMUSCULAR; INTRAVENOUS
Qty: 1 | Refills: 0 | Status: COMPLETED | OUTPATIENT
Start: 2022-11-02

## 2022-11-09 RX ORDER — ACETAMINOPHEN 500 MG/1
500 TABLET ORAL
Qty: 0 | Refills: 0 | Status: COMPLETED | OUTPATIENT
Start: 2022-11-02

## 2022-11-09 RX ORDER — RITUXIMAB 10 MG/ML
500 INJECTION, SOLUTION INTRAVENOUS
Qty: 0 | Refills: 0 | Status: COMPLETED | OUTPATIENT
Start: 2022-11-02

## 2022-11-09 NOTE — HISTORY OF PRESENT ILLNESS
[Denies] : Denies [No] : No [Yes] : Yes [Declined] : Declined [Informed consent documented in EHR.] : Informed consent documented in EHR. [TB] : Tuberculosis screening [Total Hep B core AB] : total Hepatitis B Core antibody [Left upper extremity] : Left upper extremity [22g] : 22g [Medication Name: ___] : Medication Name: [unfilled] [Start Time: ___] : Medication Start Time: [unfilled] [End Time: ___] : Medication End Time: [unfilled] [IV discontinued. Intact. No signs or symptoms of IV complications noted. Time: ___] : IV discontinued. Intact. No signs or symptoms of IV complications noted. Time: [unfilled] [Patient  instructed to seek medical attention with signs and symptoms of adverse effects] : Patient  instructed to seek medical attention with signs and symptoms of adverse effects [Patient left unit in no acute distress] : Patient left unit in no acute distress [Medications administered as ordered and tolerated well.] : Medications administered as ordered and tolerated well. [de-identified] : RPA

## 2022-12-13 ENCOUNTER — TRANSCRIPTION ENCOUNTER (OUTPATIENT)
Age: 57
End: 2022-12-13

## 2022-12-14 ENCOUNTER — OUTPATIENT (OUTPATIENT)
Dept: OUTPATIENT SERVICES | Facility: HOSPITAL | Age: 57
LOS: 1 days | End: 2022-12-14
Payer: MEDICARE

## 2022-12-14 DIAGNOSIS — M46.1 SACROILIITIS, NOT ELSEWHERE CLASSIFIED: ICD-10-CM

## 2022-12-14 PROCEDURE — G0260: CPT | Mod: LT

## 2022-12-14 PROCEDURE — 76000 FLUOROSCOPY <1 HR PHYS/QHP: CPT

## 2023-01-13 ENCOUNTER — LABORATORY RESULT (OUTPATIENT)
Age: 58
End: 2023-01-13

## 2023-01-13 ENCOUNTER — APPOINTMENT (OUTPATIENT)
Dept: RHEUMATOLOGY | Facility: CLINIC | Age: 58
End: 2023-01-13
Payer: MEDICARE

## 2023-01-13 VITALS — HEIGHT: 66 IN | TEMPERATURE: 97.2 F

## 2023-01-13 PROCEDURE — 99215 OFFICE O/P EST HI 40 MIN: CPT

## 2023-01-13 NOTE — BRIEF OPERATIVE NOTE - POST-OP DX
Negrito Linton,   Pt is interested in reviewing his gene sight test results. Should he set up a separate appointment to do this with you or plan to do it at the next scheduled f/u?
Acute cholecystitis due to biliary calculus  03/30/2018    Active  Heraclio Hammer

## 2023-01-31 ENCOUNTER — APPOINTMENT (OUTPATIENT)
Dept: RHEUMATOLOGY | Facility: CLINIC | Age: 58
End: 2023-01-31
Payer: MEDICARE

## 2023-01-31 LAB
ALBUMIN SERPL ELPH-MCNC: 4.2 G/DL
ALP BLD-CCNC: 101 U/L
ALT SERPL-CCNC: 25 U/L
ANA SER IF-ACNC: NEGATIVE
ANION GAP SERPL CALC-SCNC: 10 MMOL/L
AST SERPL-CCNC: 23 U/L
BASOPHILS # BLD AUTO: 0.03 K/UL
BASOPHILS NFR BLD AUTO: 0.5 %
BILIRUB SERPL-MCNC: 0.2 MG/DL
BUN SERPL-MCNC: 16 MG/DL
CALCIUM SERPL-MCNC: 9.1 MG/DL
CCP AB SER IA-ACNC: <8 UNITS
CHLORIDE SERPL-SCNC: 102 MMOL/L
CK SERPL-CCNC: 47 U/L
CO2 SERPL-SCNC: 28 MMOL/L
CREAT SERPL-MCNC: 0.72 MG/DL
CRP SERPL-MCNC: <3 MG/L
EGFR: 97 ML/MIN/1.73M2
EOSINOPHIL # BLD AUTO: 0.16 K/UL
EOSINOPHIL NFR BLD AUTO: 2.6 %
ERYTHROCYTE [SEDIMENTATION RATE] IN BLOOD BY WESTERGREN METHOD: 43 MM/HR
GLUCOSE SERPL-MCNC: 77 MG/DL
HCT VFR BLD CALC: 40.7 %
HGB BLD-MCNC: 12.3 G/DL
IMM GRANULOCYTES NFR BLD AUTO: 0.3 %
LYMPHOCYTES # BLD AUTO: 1.88 K/UL
LYMPHOCYTES NFR BLD AUTO: 31 %
MAN DIFF?: NORMAL
MCHC RBC-ENTMCNC: 22.5 PG
MCHC RBC-ENTMCNC: 30.2 GM/DL
MCV RBC AUTO: 74.5 FL
MONOCYTES # BLD AUTO: 0.51 K/UL
MONOCYTES NFR BLD AUTO: 8.4 %
NEUTROPHILS # BLD AUTO: 3.47 K/UL
NEUTROPHILS NFR BLD AUTO: 57.2 %
PLATELET # BLD AUTO: 282 K/UL
POTASSIUM SERPL-SCNC: 4.6 MMOL/L
PROT SERPL-MCNC: 6.8 G/DL
RBC # BLD: 5.46 M/UL
RBC # FLD: 15.9 %
RF+CCP IGG SER-IMP: NEGATIVE
RHEUMATOID FACT SER QL: <10 IU/ML
SODIUM SERPL-SCNC: 139 MMOL/L
TSH SERPL-ACNC: 1.96 UIU/ML
WBC # FLD AUTO: 6.07 K/UL

## 2023-01-31 PROCEDURE — 99442: CPT | Mod: 95

## 2023-02-02 NOTE — HISTORY OF PRESENT ILLNESS
[Home] : at home, [unfilled] , at the time of the visit. [Medical Office: ([facility])___] : at the medical office located in  [Verbal consent obtained from patient] : the patient, [unfilled] [FreeTextEntry1] : Pt presenting today for a f.u visit for RA. Doing well overall. \par Recent labs from 01/2023 reviewed with pt- stable. \par Currently taking cyclobenzaprine 10 mg daily prn for pain, also using lidocaine cream. \par On RTX 1000mg IV q 6 months. Last RTX infusion 11/2022. \par Today with no significant joint pain/swelling/stiffness to the joints. \par denies fever, chills, denies chest pain, chest palpitations, denies sob, denies nausea, vomiting, abdominal pain, denies rashes.

## 2023-02-02 NOTE — ASSESSMENT
[FreeTextEntry1] : 57 year old female presents today for an f.u visit. Previously following Dr. Meza. Dx with RA and fibromyalgia. Previously on treatment with multiple agents- doesn’t remember all names ( treated with MTX, humira, Actemra, etc). Currently on RTX 1000 mg IV q 6 months x 2 years now, cyclobenzaprine 10 mg daily. \par \par Off leflunomide now as makes her depressed\par Off tenofovir- developed severe n/v. \par \par Last RTX infusion 11/2022\par \par - repeat labs \par - +Hep B core AB. Hep f.u. Does not wish to be treated further. She understands that she should consider starting prophylactic treatment. She is at risk for reactivation/ dissemination of Hep B. \par - Quantiferon: negative\par - c/w RTX 1000mg IV q 6 months\par - c/w cyclobenzaprine 10 mg daily\par - c/w topical lidocaine 5% prn for pain\par - c/w pain management f.u\par - OT\par \par Discussed treatment plan with the patient. The patient was given the opportunity to ask questions and all questions were answered to their satisfaction.

## 2023-02-02 NOTE — HISTORY OF PRESENT ILLNESS
[FreeTextEntry1] : Pt presenting today for a f.u visit for RA. Last seen 10/2022. Chart reviewed since LV. \par Doing well overall. \par Currently taking cyclobenzaprine 10 mg daily prn for pain, also using lidocaine cream. \par On RTX 1000mg IV q 6 months. Last RTX infusion 11/2022. \par Today with no significant joint pain/swelling/stiffness to the joints. \par denies fever, chills, denies chest pain, chest palpitations, denies sob, denies nausea, vomiting, abdominal pain, denies rashes.

## 2023-02-02 NOTE — ASSESSMENT
[FreeTextEntry1] : 57 year old female presents today for an f.u visit. Previously following Dr. Meza. Dx with RA and fibromyalgia. Previously on treatment with multiple agents- doesn’t remember all names ( treated with MTX, humira, Actemra, etc). Currently on RTX 1000 mg IV q 6 months x 2 years now, cyclobenzaprine 10 mg daily. \par \par Off leflunomide now as makes her depressed\par Off tenofovir- developed severe n/v. \par \par Last RTX infusion 11/2022\par \par - +Hep B core AB. Hep f.u. Does not wish to be treated further. She understands that she should consider starting prophylactic treatment. She is at risk for reactivation/ dissemination of Hep B. \par - Quantiferon: negative\par - c/w RTX 1000mg IV q 6 months\par - c/w cyclobenzaprine 10 mg daily\par - c/w topical lidocaine 5% prn for pain\par - c/w pain management f.u\par - OT\par \par Discussed treatment plan with the patient. The patient was given the opportunity to ask questions and all questions were answered to their satisfaction.

## 2023-02-28 NOTE — ED ADULT NURSE REASSESSMENT NOTE - NS ED NURSE REASSESS COMMENT FT1
Surgery team @ bedside to evaluate again. per PA, pt to have additional plasmalyte bolus and then full set labs drawn and abd US. plasmalyte bolus hung @ this time. Terbinafine Counseling: Patient counseling regarding adverse effects of terbinafine including but not limited to headache, diarrhea, rash, upset stomach, liver function test abnormalities, itching, taste/smell disturbance, nausea, abdominal pain, and flatulence.  There is a rare possibility of liver failure that can occur when taking terbinafine.  The patient understands that a baseline LFT and kidney function test may be required. The patient verbalized understanding of the proper use and possible adverse effects of terbinafine.  All of the patient's questions and concerns were addressed.

## 2023-04-17 ENCOUNTER — LABORATORY RESULT (OUTPATIENT)
Age: 58
End: 2023-04-17

## 2023-04-17 ENCOUNTER — APPOINTMENT (OUTPATIENT)
Dept: RHEUMATOLOGY | Facility: CLINIC | Age: 58
End: 2023-04-17
Payer: MEDICARE

## 2023-04-17 VITALS
TEMPERATURE: 97.6 F | HEIGHT: 66 IN | WEIGHT: 148 LBS | SYSTOLIC BLOOD PRESSURE: 140 MMHG | RESPIRATION RATE: 17 BRPM | BODY MASS INDEX: 23.78 KG/M2 | DIASTOLIC BLOOD PRESSURE: 100 MMHG

## 2023-04-17 PROCEDURE — 99215 OFFICE O/P EST HI 40 MIN: CPT

## 2023-05-15 ENCOUNTER — APPOINTMENT (OUTPATIENT)
Dept: RHEUMATOLOGY | Facility: CLINIC | Age: 58
End: 2023-05-15
Payer: MEDICARE

## 2023-05-15 VITALS — HEART RATE: 69 BPM | SYSTOLIC BLOOD PRESSURE: 131 MMHG | OXYGEN SATURATION: 100 % | DIASTOLIC BLOOD PRESSURE: 87 MMHG

## 2023-05-15 VITALS — HEART RATE: 89 BPM | OXYGEN SATURATION: 100 % | SYSTOLIC BLOOD PRESSURE: 131 MMHG | DIASTOLIC BLOOD PRESSURE: 90 MMHG

## 2023-05-15 VITALS
SYSTOLIC BLOOD PRESSURE: 122 MMHG | TEMPERATURE: 98 F | RESPIRATION RATE: 16 BRPM | HEART RATE: 80 BPM | OXYGEN SATURATION: 100 % | DIASTOLIC BLOOD PRESSURE: 83 MMHG

## 2023-05-15 VITALS
RESPIRATION RATE: 16 BRPM | TEMPERATURE: 98.4 F | DIASTOLIC BLOOD PRESSURE: 89 MMHG | HEART RATE: 99 BPM | SYSTOLIC BLOOD PRESSURE: 135 MMHG | OXYGEN SATURATION: 100 %

## 2023-05-15 VITALS — HEART RATE: 95 BPM | DIASTOLIC BLOOD PRESSURE: 85 MMHG | SYSTOLIC BLOOD PRESSURE: 126 MMHG

## 2023-05-15 PROCEDURE — 96375 TX/PRO/DX INJ NEW DRUG ADDON: CPT | Mod: 59

## 2023-05-15 PROCEDURE — 96415 CHEMO IV INFUSION ADDL HR: CPT

## 2023-05-15 PROCEDURE — 96374 THER/PROPH/DIAG INJ IV PUSH: CPT | Mod: 59

## 2023-05-15 PROCEDURE — 96413 CHEMO IV INFUSION 1 HR: CPT

## 2023-05-15 RX ORDER — ACETAMINOPHEN 500 MG/1
500 TABLET ORAL
Qty: 0 | Refills: 0 | Status: COMPLETED | OUTPATIENT
Start: 2023-05-04

## 2023-05-15 RX ORDER — RITUXIMAB 10 MG/ML
500 INJECTION, SOLUTION INTRAVENOUS
Qty: 0 | Refills: 0 | Status: COMPLETED | OUTPATIENT
Start: 2023-05-15

## 2023-05-15 RX ORDER — DIPHENHYDRAMINE HYDROCHLORIDE 50 MG/ML
50 INJECTION, SOLUTION INTRAMUSCULAR; INTRAVENOUS
Qty: 1 | Refills: 0 | Status: COMPLETED | OUTPATIENT
Start: 2023-05-04

## 2023-05-15 RX ORDER — METHYLPREDNISOLONE 125 MG/2ML
125 INJECTION, POWDER, LYOPHILIZED, FOR SOLUTION INTRAMUSCULAR; INTRAVENOUS
Qty: 0 | Refills: 0 | Status: COMPLETED | OUTPATIENT
Start: 2023-05-04

## 2023-05-15 NOTE — HISTORY OF PRESENT ILLNESS
[6] : 6 [Denies] : Denies [No] : No [Yes] : Yes [Declined] : Declined [Informed consent documented in EHR.] : Informed consent documented in EHR. [TB] : Tuberculosis screening [Hep acute panel] : Hepatitis acute panel [Right upper extremity] : Right upper extremity [24g] : 24g [Medication Name: ___] : Medication Name: [unfilled] [Start Time: ___] : Medication Start Time: [unfilled] [IV discontinued. Intact. No signs or symptoms of IV complications noted. Time: ___] : IV discontinued. Intact. No signs or symptoms of IV complications noted. Time: [unfilled] [Patient  instructed to seek medical attention with signs and symptoms of adverse effects] : Patient  instructed to seek medical attention with signs and symptoms of adverse effects [Patient left unit in no acute distress] : Patient left unit in no acute distress [Medications administered as ordered and tolerated well.] : Medications administered as ordered and tolerated well. [de-identified] : joint pain, hands, back, right shoulder [End Time: ___] : Medication End Time: [unfilled] [de-identified] : 9:40am

## 2023-05-16 NOTE — HISTORY OF PRESENT ILLNESS
[Home] : at home, [unfilled] , at the time of the visit. [Medical Office: ([facility])___] : at the medical office located in  [Verbal consent obtained from patient] : the patient, [unfilled] [FreeTextEntry1] : Pt presenting today for a f.u visit for RA. Reports symptoms are stable. Doing well overall. \par Last seen 01/2023. Chart reviewed since LV. \par Currently taking cyclobenzaprine 10 mg daily prn for pain, also using lidocaine cream. \par On RTX 1000mg IV q 6 months. Last RTX infusion 11/2022. \par Today with no significant joint pain/swelling/stiffness to the joints. \par denies fever, chills, denies chest pain, chest palpitations, denies sob, denies nausea, vomiting, abdominal pain, denies rashes.

## 2023-05-16 NOTE — ASSESSMENT
[FreeTextEntry1] : 57 year old female presents today for an f.u visit. Previously following Dr. Meza. Dx with RA and fibromyalgia. Previously on treatment with multiple agents- doesn’t remember all names ( treated with MTX, humira, Actemra, etc). Currently on RTX 1000 mg IV q 6 months x 2 years now, cyclobenzaprine 10 mg daily. \par \par Off leflunomide now as makes her depressed\par Off tenofovir- developed severe n/v. \par \par Last RTX infusion 11/2022\par \par - repeat labs\par - +Hep B core AB. Hep f.u. Does not wish to be treated further. She understands that she should consider starting prophylactic treatment. She is at risk for reactivation/ dissemination of Hep B. \par - Quantiferon: negative\par - c/w RTX 1000mg IV q 6 months\par - c/w cyclobenzaprine 10 mg daily\par - c/w topical lidocaine 5% prn for pain\par - c/w pain management f.u\par - OT\par \par Discussed treatment plan with the patient. The patient was given the opportunity to ask questions and all questions were answered to their satisfaction.

## 2023-05-31 ENCOUNTER — APPOINTMENT (OUTPATIENT)
Dept: RHEUMATOLOGY | Facility: CLINIC | Age: 58
End: 2023-05-31
Payer: MEDICARE

## 2023-05-31 VITALS
TEMPERATURE: 98.5 F | SYSTOLIC BLOOD PRESSURE: 123 MMHG | HEART RATE: 82 BPM | OXYGEN SATURATION: 99 % | RESPIRATION RATE: 99 BRPM | DIASTOLIC BLOOD PRESSURE: 83 MMHG

## 2023-05-31 VITALS
OXYGEN SATURATION: 99 % | DIASTOLIC BLOOD PRESSURE: 84 MMHG | RESPIRATION RATE: 18 BRPM | SYSTOLIC BLOOD PRESSURE: 128 MMHG | HEART RATE: 65 BPM

## 2023-05-31 VITALS
SYSTOLIC BLOOD PRESSURE: 122 MMHG | OXYGEN SATURATION: 98 % | HEART RATE: 87 BPM | DIASTOLIC BLOOD PRESSURE: 83 MMHG | TEMPERATURE: 98.2 F | RESPIRATION RATE: 18 BRPM

## 2023-05-31 VITALS
SYSTOLIC BLOOD PRESSURE: 116 MMHG | DIASTOLIC BLOOD PRESSURE: 84 MMHG | HEART RATE: 84 BPM | RESPIRATION RATE: 18 BRPM | OXYGEN SATURATION: 98 %

## 2023-05-31 VITALS
HEART RATE: 66 BPM | SYSTOLIC BLOOD PRESSURE: 135 MMHG | OXYGEN SATURATION: 100 % | DIASTOLIC BLOOD PRESSURE: 84 MMHG | RESPIRATION RATE: 18 BRPM

## 2023-05-31 PROCEDURE — 96415 CHEMO IV INFUSION ADDL HR: CPT

## 2023-05-31 PROCEDURE — 96375 TX/PRO/DX INJ NEW DRUG ADDON: CPT | Mod: 59

## 2023-05-31 PROCEDURE — 96413 CHEMO IV INFUSION 1 HR: CPT

## 2023-05-31 PROCEDURE — 96374 THER/PROPH/DIAG INJ IV PUSH: CPT | Mod: 59

## 2023-05-31 RX ORDER — METHYLPREDNISOLONE 125 MG/2ML
125 INJECTION, POWDER, LYOPHILIZED, FOR SOLUTION INTRAMUSCULAR; INTRAVENOUS
Qty: 0 | Refills: 0 | Status: COMPLETED | OUTPATIENT
Start: 2023-05-30

## 2023-05-31 RX ORDER — ACETAMINOPHEN 500 MG/1
500 TABLET ORAL
Qty: 0 | Refills: 0 | Status: COMPLETED | OUTPATIENT
Start: 2023-05-30

## 2023-05-31 RX ORDER — DIPHENHYDRAMINE HYDROCHLORIDE 50 MG/ML
50 INJECTION, SOLUTION INTRAMUSCULAR; INTRAVENOUS
Qty: 1 | Refills: 0 | Status: COMPLETED | OUTPATIENT
Start: 2023-05-30

## 2023-05-31 RX ORDER — RITUXIMAB 10 MG/ML
500 INJECTION, SOLUTION INTRAVENOUS
Qty: 0 | Refills: 0 | Status: COMPLETED | OUTPATIENT
Start: 2023-05-30

## 2023-05-31 NOTE — HISTORY OF PRESENT ILLNESS
[6] : 6 [Denies] : Denies [No] : No [Yes] : Yes [Declined] : Declined [Informed consent documented in EHR.] : Informed consent documented in EHR. [TB] : Tuberculosis screening [Hep acute panel] : Hepatitis acute panel [Right upper extremity] : Right upper extremity [24g] : 24g [IV discontinued. Intact. No signs or symptoms of IV complications noted. Time: ___] : IV discontinued. Intact. No signs or symptoms of IV complications noted. Time: [unfilled] [Patient  instructed to seek medical attention with signs and symptoms of adverse effects] : Patient  instructed to seek medical attention with signs and symptoms of adverse effects [Patient left unit in no acute distress] : Patient left unit in no acute distress [Medications administered as ordered and tolerated well.] : Medications administered as ordered and tolerated well. [Medication Name: ___] : Medication Name: [unfilled] [Start Time: ___] : Start Time: [unfilled] [de-identified] : hands, back [End Time: ___] : Medication End Time: [unfilled] [de-identified] : 9:33am

## 2023-06-21 ENCOUNTER — APPOINTMENT (OUTPATIENT)
Dept: RHEUMATOLOGY | Facility: CLINIC | Age: 58
End: 2023-06-21
Payer: MEDICARE

## 2023-06-21 VITALS — HEART RATE: 77 BPM | TEMPERATURE: 97.9 F | HEIGHT: 66 IN | OXYGEN SATURATION: 98 % | RESPIRATION RATE: 17 BRPM

## 2023-06-21 PROCEDURE — 99215 OFFICE O/P EST HI 40 MIN: CPT

## 2023-06-21 NOTE — HISTORY OF PRESENT ILLNESS
[FreeTextEntry1] : Pt presenting today for a f.u visit for RA. Reports symptoms are stable. Doing well overall. \par Last seen 04/2023. Chart reviewed since LV. \par Currently taking cyclobenzaprine 10 mg daily prn for pain, also using lidocaine cream. \par On RTX 1000mg IV q 6 months. Last RTX infusion 05/2023. \par Today with no significant joint pain/swelling/stiffness to the joints. \par denies fever, chills, denies chest pain, chest palpitations, denies sob, denies nausea, vomiting, abdominal pain, denies rashes. \par ROS otherwise unchanged from LV.

## 2023-06-21 NOTE — ASSESSMENT
[FreeTextEntry1] : 57 year old female presents today for an f.u visit. Previously following Dr. Meza. Dx with RA and fibromyalgia. Previously on treatment with multiple agents- doesn’t remember all names ( treated with MTX, humira, Actemra, etc). Currently on RTX 1000 mg IV q 6 months, cyclobenzaprine 10 mg daily. \par \par Off leflunomide now as makes her depressed\par Off tenofovir- developed severe n/v. \par \par Last RTX infusion 05/2023\par \par - repeat labs prior to follow up\par - +Hep B core AB. Hep f.u. Does not wish to be treated further. She understands that she should consider starting prophylactic treatment. She is at risk for reactivation/ dissemination of Hep B. \par - Quantiferon: negative\par - c/w RTX 1000mg IV q 6 months\par - c/w cyclobenzaprine 10 mg daily\par - c/w topical lidocaine 5% prn for pain\par - c/w pain management f.u\par - OT\par \par Discussed treatment plan with the patient. The patient was given the opportunity to ask questions and all questions were answered to their satisfaction.

## 2023-07-12 LAB
ALBUMIN SERPL ELPH-MCNC: 3.8 G/DL
ALP BLD-CCNC: 80 U/L
ALT SERPL-CCNC: 22 U/L
ANION GAP SERPL CALC-SCNC: 9 MMOL/L
AST SERPL-CCNC: 18 U/L
BASOPHILS # BLD AUTO: 0.03 K/UL
BASOPHILS NFR BLD AUTO: 0.5 %
BILIRUB SERPL-MCNC: 0.4 MG/DL
BUN SERPL-MCNC: 13 MG/DL
CALCIUM SERPL-MCNC: 9 MG/DL
CHLORIDE SERPL-SCNC: 104 MMOL/L
CK SERPL-CCNC: 33 U/L
CO2 SERPL-SCNC: 27 MMOL/L
CREAT SERPL-MCNC: 0.71 MG/DL
CREAT SPEC-SCNC: 264 MG/DL
CREAT/PROT UR: 0.1 RATIO
CRP SERPL-MCNC: <3 MG/L
EGFR: 98 ML/MIN/1.73M2
EOSINOPHIL # BLD AUTO: 0.3 K/UL
EOSINOPHIL NFR BLD AUTO: 5.4 %
ERYTHROCYTE [SEDIMENTATION RATE] IN BLOOD BY WESTERGREN METHOD: 35 MM/HR
GLUCOSE SERPL-MCNC: 82 MG/DL
HCT VFR BLD CALC: 38.4 %
HGB BLD-MCNC: 11.7 G/DL
IMM GRANULOCYTES NFR BLD AUTO: 0.4 %
LYMPHOCYTES # BLD AUTO: 1.8 K/UL
LYMPHOCYTES NFR BLD AUTO: 32.4 %
MAN DIFF?: NORMAL
MCHC RBC-ENTMCNC: 23.1 PG
MCHC RBC-ENTMCNC: 30.5 GM/DL
MCV RBC AUTO: 75.9 FL
MONOCYTES # BLD AUTO: 0.57 K/UL
MONOCYTES NFR BLD AUTO: 10.3 %
NEUTROPHILS # BLD AUTO: 2.83 K/UL
NEUTROPHILS NFR BLD AUTO: 51 %
PLATELET # BLD AUTO: 256 K/UL
POTASSIUM SERPL-SCNC: 4.3 MMOL/L
PROT SERPL-MCNC: 6.1 G/DL
PROT UR-MCNC: 14 MG/DL
RBC # BLD: 5.06 M/UL
RBC # FLD: 16.8 %
SODIUM SERPL-SCNC: 141 MMOL/L
WBC # FLD AUTO: 5.55 K/UL

## 2023-08-28 ENCOUNTER — APPOINTMENT (OUTPATIENT)
Dept: OBGYN | Facility: CLINIC | Age: 58
End: 2023-08-28
Payer: MEDICARE

## 2023-08-28 VITALS
HEART RATE: 74 BPM | BODY MASS INDEX: 24.43 KG/M2 | RESPIRATION RATE: 18 BRPM | HEIGHT: 66 IN | DIASTOLIC BLOOD PRESSURE: 68 MMHG | WEIGHT: 152 LBS | SYSTOLIC BLOOD PRESSURE: 114 MMHG

## 2023-08-28 DIAGNOSIS — Z01.419 ENCOUNTER FOR GYNECOLOGICAL EXAMINATION (GENERAL) (ROUTINE) W/OUT ABNORMAL FINDINGS: ICD-10-CM

## 2023-08-28 LAB
CARD LOT #: NORMAL
CARD LOT EXP DATE: NORMAL
DATE COLLECTED: NORMAL
DEVELOPER LOT #: NORMAL
DEVELOPER LOT EXP DATE: NORMAL
HEMOCCULT 2: NEGATIVE
HEMOCCULT SP1 STL QL: NEGATIVE
QUALITY CONTROL: YES
QUALITY CONTROL: YES

## 2023-08-28 PROCEDURE — G0101: CPT

## 2023-08-28 PROCEDURE — 82270 OCCULT BLOOD FECES: CPT

## 2023-08-28 NOTE — PHYSICAL EXAM
[Chaperone Declined] : Patient declined chaperone [Appropriately responsive] : appropriately responsive [Alert] : alert [No Acute Distress] : no acute distress [Soft] : soft [Non-tender] : non-tender [Non-distended] : non-distended [No HSM] : No HSM [No Lesions] : no lesions [No Mass] : no mass [Oriented x3] : oriented x3 [Examination Of The Breasts] : a normal appearance [No Masses] : no breast masses were palpable [Vulvar Atrophy] : vulvar atrophy [Labia Majora] : normal [Labia Minora] : normal [Atrophy] : atrophy [Uterine Adnexae] : normal [Normal rectal exam] : was normal [Normal Brown Stool] : was normal and brown [Occult Blood Positive] : was negative for occult blood analysis [Internal Hemorrhoid] : no internal hemorrhoids were present [External Hemorrhoid] : no external hemorrhoids were present [Skin Tags] : no residual hemorrhoidal skin tags [Normal] : was normal [None] : there was no rectal mass

## 2023-08-28 NOTE — COUNSELING
[Nutrition/ Exercise/ Weight Management] : nutrition, exercise, weight management [Breast Self Exam] : breast self exam [Bladder Hygiene] : bladder hygiene [Influenza Vaccine] : influenza vaccine [Vaccines] : vaccines

## 2023-09-08 ENCOUNTER — NON-APPOINTMENT (OUTPATIENT)
Age: 58
End: 2023-09-08

## 2023-09-13 LAB — CYTOLOGY CVX/VAG DOC THIN PREP: ABNORMAL

## 2023-10-24 ENCOUNTER — APPOINTMENT (OUTPATIENT)
Dept: RHEUMATOLOGY | Facility: CLINIC | Age: 58
End: 2023-10-24
Payer: MEDICARE

## 2023-10-24 VITALS
OXYGEN SATURATION: 97 % | DIASTOLIC BLOOD PRESSURE: 86 MMHG | BODY MASS INDEX: 24.75 KG/M2 | TEMPERATURE: 98 F | SYSTOLIC BLOOD PRESSURE: 126 MMHG | HEART RATE: 79 BPM | RESPIRATION RATE: 17 BRPM | HEIGHT: 66 IN | WEIGHT: 154 LBS

## 2023-10-24 PROCEDURE — 99215 OFFICE O/P EST HI 40 MIN: CPT

## 2023-10-24 RX ORDER — RITUXIMAB 10 MG/ML
500 INJECTION, SOLUTION INTRAVENOUS
Qty: 2 | Refills: 0 | Status: ACTIVE | COMMUNITY
Start: 2023-10-24

## 2023-11-27 LAB
ALBUMIN SERPL ELPH-MCNC: 3.9 G/DL
ALP BLD-CCNC: 91 U/L
ALT SERPL-CCNC: 26 U/L
ANA PAT FLD IF-IMP: ABNORMAL
ANA SER IF-ACNC: ABNORMAL
ANION GAP SERPL CALC-SCNC: 9 MMOL/L
AST SERPL-CCNC: 21 U/L
BASOPHILS # BLD AUTO: 0.05 K/UL
BASOPHILS NFR BLD AUTO: 0.7 %
BILIRUB SERPL-MCNC: 0.4 MG/DL
BUN SERPL-MCNC: 15 MG/DL
CALCIUM SERPL-MCNC: 9.2 MG/DL
CCP AB SER IA-ACNC: <8 UNITS
CHLORIDE SERPL-SCNC: 104 MMOL/L
CK SERPL-CCNC: 41 U/L
CO2 SERPL-SCNC: 27 MMOL/L
CREAT SERPL-MCNC: 0.72 MG/DL
CRP SERPL-MCNC: <3 MG/L
EGFR: 96 ML/MIN/1.73M2
EOSINOPHIL # BLD AUTO: 0.23 K/UL
EOSINOPHIL NFR BLD AUTO: 3.4 %
ERYTHROCYTE [SEDIMENTATION RATE] IN BLOOD BY WESTERGREN METHOD: 18 MM/HR
GLUCOSE SERPL-MCNC: 91 MG/DL
HAV IGM SER QL: NONREACTIVE
HBV CORE IGG+IGM SER QL: REACTIVE
HBV CORE IGM SER QL: NONREACTIVE
HBV E AB SER QL: NONREACTIVE
HBV E AG SER QL: NONREACTIVE
HBV SURFACE AB SER QL: REACTIVE
HBV SURFACE AG SER QL: NONREACTIVE
HCT VFR BLD CALC: 38.2 %
HCV AB SER QL: NONREACTIVE
HCV S/CO RATIO: 0.08 S/CO
HEPATITIS A IGG ANTIBODY: REACTIVE
HEPATITIS A IGG ANTIBODY: REACTIVE
HGB BLD-MCNC: 11.6 G/DL
IMM GRANULOCYTES NFR BLD AUTO: 0.6 %
LYMPHOCYTES # BLD AUTO: 1.9 K/UL
LYMPHOCYTES NFR BLD AUTO: 27.7 %
M TB IFN-G BLD-IMP: ABNORMAL
MAN DIFF?: NORMAL
MCHC RBC-ENTMCNC: 22.7 PG
MCHC RBC-ENTMCNC: 30.4 GM/DL
MCV RBC AUTO: 74.8 FL
MONOCYTES # BLD AUTO: 0.56 K/UL
MONOCYTES NFR BLD AUTO: 8.2 %
NEUTROPHILS # BLD AUTO: 4.08 K/UL
NEUTROPHILS NFR BLD AUTO: 59.4 %
PLATELET # BLD AUTO: 273 K/UL
POTASSIUM SERPL-SCNC: 4.4 MMOL/L
PROT SERPL-MCNC: 6.1 G/DL
QUANTIFERON TB PLUS MITOGEN MINUS NIL: 0.48 IU/ML
QUANTIFERON TB PLUS NIL: 0.02 IU/ML
QUANTIFERON TB PLUS TB1 MINUS NIL: 0 IU/ML
QUANTIFERON TB PLUS TB2 MINUS NIL: 0 IU/ML
RBC # BLD: 5.11 M/UL
RBC # FLD: 15.7 %
RF+CCP IGG SER-IMP: NEGATIVE
RHEUMATOID FACT SER QL: <10 IU/ML
SODIUM SERPL-SCNC: 140 MMOL/L
TSH SERPL-ACNC: 1.75 UIU/ML
WBC # FLD AUTO: 6.86 K/UL

## 2023-11-28 ENCOUNTER — APPOINTMENT (OUTPATIENT)
Dept: RHEUMATOLOGY | Facility: CLINIC | Age: 58
End: 2023-11-28
Payer: MEDICARE

## 2023-11-28 VITALS
OXYGEN SATURATION: 98 % | RESPIRATION RATE: 16 BRPM | DIASTOLIC BLOOD PRESSURE: 86 MMHG | SYSTOLIC BLOOD PRESSURE: 130 MMHG | HEART RATE: 78 BPM | TEMPERATURE: 98.1 F

## 2023-11-28 VITALS
RESPIRATION RATE: 15 BRPM | OXYGEN SATURATION: 98 % | HEART RATE: 88 BPM | TEMPERATURE: 97.9 F | SYSTOLIC BLOOD PRESSURE: 124 MMHG | DIASTOLIC BLOOD PRESSURE: 84 MMHG

## 2023-11-28 VITALS
RESPIRATION RATE: 16 BRPM | HEART RATE: 71 BPM | OXYGEN SATURATION: 99 % | DIASTOLIC BLOOD PRESSURE: 82 MMHG | SYSTOLIC BLOOD PRESSURE: 129 MMHG

## 2023-11-28 VITALS
RESPIRATION RATE: 16 BRPM | SYSTOLIC BLOOD PRESSURE: 129 MMHG | OXYGEN SATURATION: 98 % | DIASTOLIC BLOOD PRESSURE: 85 MMHG | HEART RATE: 80 BPM

## 2023-11-28 PROCEDURE — 96415 CHEMO IV INFUSION ADDL HR: CPT

## 2023-11-28 PROCEDURE — 96374 THER/PROPH/DIAG INJ IV PUSH: CPT | Mod: 59

## 2023-11-28 PROCEDURE — 96413 CHEMO IV INFUSION 1 HR: CPT

## 2023-11-28 PROCEDURE — 96375 TX/PRO/DX INJ NEW DRUG ADDON: CPT | Mod: 59

## 2023-11-28 PROCEDURE — 36415 COLL VENOUS BLD VENIPUNCTURE: CPT

## 2023-11-28 RX ORDER — DIPHENHYDRAMINE HYDROCHLORIDE 50 MG/ML
50 INJECTION, SOLUTION INTRAMUSCULAR; INTRAVENOUS
Qty: 1 | Refills: 0 | Status: COMPLETED | OUTPATIENT
Start: 2023-11-01

## 2023-11-28 RX ORDER — RITUXIMAB 10 MG/ML
500 INJECTION, SOLUTION INTRAVENOUS
Qty: 0 | Refills: 0 | Status: COMPLETED | OUTPATIENT
Start: 2023-11-01

## 2023-11-28 RX ORDER — ACETAMINOPHEN 500 MG/1
500 TABLET ORAL
Qty: 0 | Refills: 0 | Status: COMPLETED | OUTPATIENT
Start: 2023-11-01

## 2023-11-28 RX ORDER — METHYLPREDNISOLONE 125 MG/2ML
125 INJECTION, POWDER, LYOPHILIZED, FOR SOLUTION INTRAMUSCULAR; INTRAVENOUS
Qty: 0 | Refills: 0 | Status: COMPLETED | OUTPATIENT
Start: 2023-11-01

## 2023-12-08 LAB
HEPB DNA PCR INT: NOT DETECTED
HEPB DNA PCR LOG: NOT DETECTED LOGIU/ML
M TB IFN-G BLD-IMP: NEGATIVE
QUANTIFERON TB PLUS MITOGEN MINUS NIL: 1.12 IU/ML
QUANTIFERON TB PLUS NIL: 0.04 IU/ML
QUANTIFERON TB PLUS TB1 MINUS NIL: -0.01 IU/ML
QUANTIFERON TB PLUS TB2 MINUS NIL: -0.01 IU/ML

## 2023-12-12 ENCOUNTER — APPOINTMENT (OUTPATIENT)
Dept: RHEUMATOLOGY | Facility: CLINIC | Age: 58
End: 2023-12-12
Payer: MEDICARE

## 2023-12-12 VITALS
HEART RATE: 68 BPM | SYSTOLIC BLOOD PRESSURE: 124 MMHG | RESPIRATION RATE: 16 BRPM | OXYGEN SATURATION: 100 % | DIASTOLIC BLOOD PRESSURE: 85 MMHG

## 2023-12-12 VITALS
DIASTOLIC BLOOD PRESSURE: 77 MMHG | RESPIRATION RATE: 17 BRPM | SYSTOLIC BLOOD PRESSURE: 109 MMHG | HEART RATE: 80 BPM | OXYGEN SATURATION: 98 % | TEMPERATURE: 98 F

## 2023-12-12 VITALS
SYSTOLIC BLOOD PRESSURE: 116 MMHG | OXYGEN SATURATION: 100 % | RESPIRATION RATE: 16 BRPM | HEART RATE: 72 BPM | DIASTOLIC BLOOD PRESSURE: 76 MMHG

## 2023-12-12 VITALS
RESPIRATION RATE: 16 BRPM | TEMPERATURE: 98.2 F | SYSTOLIC BLOOD PRESSURE: 130 MMHG | DIASTOLIC BLOOD PRESSURE: 84 MMHG | OXYGEN SATURATION: 100 % | HEART RATE: 84 BPM

## 2023-12-12 PROCEDURE — 96375 TX/PRO/DX INJ NEW DRUG ADDON: CPT | Mod: 59

## 2023-12-12 PROCEDURE — 96413 CHEMO IV INFUSION 1 HR: CPT

## 2023-12-12 PROCEDURE — 96374 THER/PROPH/DIAG INJ IV PUSH: CPT | Mod: 59

## 2023-12-12 PROCEDURE — 96415 CHEMO IV INFUSION ADDL HR: CPT

## 2023-12-12 RX ORDER — METHYLPREDNISOLONE 125 MG/2ML
125 INJECTION, POWDER, LYOPHILIZED, FOR SOLUTION INTRAMUSCULAR; INTRAVENOUS
Qty: 0 | Refills: 0 | Status: COMPLETED | OUTPATIENT
Start: 2023-12-08

## 2023-12-12 RX ORDER — ACETAMINOPHEN 500 MG/1
500 TABLET ORAL
Qty: 0 | Refills: 0 | Status: COMPLETED | OUTPATIENT
Start: 2023-12-08

## 2023-12-12 RX ORDER — RITUXIMAB 10 MG/ML
500 INJECTION, SOLUTION INTRAVENOUS
Qty: 0 | Refills: 0 | Status: COMPLETED | OUTPATIENT
Start: 2023-12-08

## 2023-12-12 RX ORDER — DIPHENHYDRAMINE HYDROCHLORIDE 50 MG/ML
50 INJECTION, SOLUTION INTRAMUSCULAR; INTRAVENOUS
Qty: 1 | Refills: 0 | Status: COMPLETED | OUTPATIENT
Start: 2023-12-08

## 2024-01-17 NOTE — ED ADULT TRIAGE NOTE - NS ED NURSE DIRECT TO ROOM YN
- suspect acute tubular injury secondary to hypotension/cardiogenic shock likely compounded by intra-arterial contrast and anemia with urinary sediment with granular casts    -No acute indication for RRT   -Cystatin C (ordered)   -strict I/O's  -Continue to monitor for signs if renal recovery   -renal diet/tube feeds when not NPO, volume restriction per primary team  -renally all dose medications to eGFR  -avoid nephrotoxic agents wean feasible (i.e. NSAIDs, intra-arterial contrast, supra-therapeutic vancomycin levels, etc.)     Yes

## 2024-03-12 ENCOUNTER — APPOINTMENT (OUTPATIENT)
Dept: RHEUMATOLOGY | Facility: CLINIC | Age: 59
End: 2024-03-12
Payer: MEDICARE

## 2024-03-12 VITALS
WEIGHT: 152 LBS | OXYGEN SATURATION: 99 % | DIASTOLIC BLOOD PRESSURE: 70 MMHG | HEIGHT: 66 IN | SYSTOLIC BLOOD PRESSURE: 110 MMHG | HEART RATE: 82 BPM | BODY MASS INDEX: 24.43 KG/M2 | TEMPERATURE: 98 F | RESPIRATION RATE: 17 BRPM

## 2024-03-12 DIAGNOSIS — M79.641 PAIN IN RIGHT HAND: ICD-10-CM

## 2024-03-12 DIAGNOSIS — M79.642 PAIN IN RIGHT HAND: ICD-10-CM

## 2024-03-12 PROCEDURE — 99215 OFFICE O/P EST HI 40 MIN: CPT

## 2024-03-12 PROCEDURE — G2211 COMPLEX E/M VISIT ADD ON: CPT

## 2024-03-12 NOTE — ASSESSMENT
[FreeTextEntry1] : 58 year old female presents today for an f.u visit. Previously following Dr. Meza. Dx with RA and fibromyalgia. Previously on treatment with multiple agents- doesn't remember all names ( treated with MTX, humira, Actemra, etc). Currently on RTX 1000 mg IV q 6 months, cyclobenzaprine 10 mg daily.  Off leflunomide now as makes her depressed Off tenofovir- developed severe n/v.  Last RTX infusion 12/2023  - repeat labs prior to follow up - +Hep B core AB. Hep f.u. Does not wish to be treated further. She understands that she should consider starting prophylactic treatment. She is at risk for reactivation/ dissemination of Hep B. - QuantiFERON: negative - c/w RTX 1000mg IV q 6 months + premeds - c/w cyclobenzaprine 10 mg daily - c/w gabapentin 300mg BID-TID - c/w topical lidocaine 5% prn for pain - reviewed risk and benefits of medications - c/w pain management f.u - OT  Discussed treatment plan with the patient. The patient was given the opportunity to ask questions and all questions were answered to their satisfaction.

## 2024-03-12 NOTE — HISTORY OF PRESENT ILLNESS
[FreeTextEntry1] : Pt presenting today for a f.u visit for RA. Reports symptoms are stable.  Last seen 10/2023. Chart reviewed since LV.  Currently taking cyclobenzaprine 10 mg daily prn for pain, gabapentin 300mg BID-TID, also using lidocaine cream. Tolerating medication well. No side effects.  On RTX 1000mg IV q 6 months. Last RTX infusion 12/2023.  Today with no significant joint pain/swelling/stiffness to the joints.  denies fever, chills, denies chest pain, chest palpitations, denies sob, denies nausea, vomiting, abdominal pain, denies rashes.  ROS otherwise unchanged from LV.

## 2024-03-12 NOTE — PHYSICAL EXAM
[General Appearance - Alert] : alert [General Appearance - In No Acute Distress] : in no acute distress [General Appearance - Well Nourished] : well nourished [General Appearance - Well Developed] : well developed [Sclera] : the sclera and conjunctiva were normal [PERRL With Normal Accommodation] : pupils were equal in size, round, and reactive to light [Extraocular Movements] : extraocular movements were intact [Outer Ear] : the ears and nose were normal in appearance [Neck Appearance] : the appearance of the neck was normal [Jugular Venous Distention Increased] : there was no jugular-venous distention [Heart Rate And Rhythm] : heart rate was normal and rhythm regular [Auscultation Breath Sounds / Voice Sounds] : lungs were clear to auscultation bilaterally [Heart Sounds Gallop] : no gallops [Heart Sounds] : normal S1 and S2 [Murmurs] : no murmurs [Heart Sounds Pericardial Friction Rub] : no pericardial rub [Bowel Sounds] : normal bowel sounds [Abdomen Soft] : soft [Abdomen Tenderness] : non-tender [No CVA Tenderness] : no ~M costovertebral angle tenderness [No Spinal Tenderness] : no spinal tenderness [Musculoskeletal - Swelling] : no joint swelling seen [Involuntary Movements] : no involuntary movements were seen [Skin Lesions] : no skin lesions [] : no rash [No Focal Deficits] : no focal deficits [Motor Exam] : the motor exam was normal [Sensation] : the sensory exam was normal to light touch and pinprick [Impaired Insight] : insight and judgment were intact [Oriented To Time, Place, And Person] : oriented to person, place, and time [Affect] : the affect was normal [Mood] : the mood was normal

## 2024-04-10 ENCOUNTER — APPOINTMENT (OUTPATIENT)
Dept: NEUROLOGY | Facility: CLINIC | Age: 59
End: 2024-04-10
Payer: MEDICARE

## 2024-04-10 VITALS
DIASTOLIC BLOOD PRESSURE: 70 MMHG | SYSTOLIC BLOOD PRESSURE: 118 MMHG | HEIGHT: 66 IN | WEIGHT: 152 LBS | BODY MASS INDEX: 24.43 KG/M2

## 2024-04-10 DIAGNOSIS — Z82.3 FAMILY HISTORY OF STROKE: ICD-10-CM

## 2024-04-10 DIAGNOSIS — Z87.39 PERSONAL HISTORY OF OTHER DISEASES OF THE MUSCULOSKELETAL SYSTEM AND CONNECTIVE TISSUE: ICD-10-CM

## 2024-04-10 PROCEDURE — 99204 OFFICE O/P NEW MOD 45 MIN: CPT

## 2024-04-10 NOTE — HISTORY OF PRESENT ILLNESS
[FreeTextEntry1] : I saw this patient in the office today.  She describes pains in her feet since 2017. She ultimately had some type of autonomic testing which suggested small fiber neuropathy.  She was referred here for further evaluation. She has been on gabapentin for many years for the pains from her rheumatoid arthritis.

## 2024-04-10 NOTE — PHYSICAL EXAM
[General Appearance - Alert] : alert [General Appearance - In No Acute Distress] : in no acute distress [Oriented To Time, Place, And Person] : oriented to person, place, and time [Affect] : the affect was normal [Memory Recent] : recent memory was not impaired [Memory Remote] : remote memory was not impaired [Dysarthria] : no dysarthria [Aphasia] : no dysphasia/aphasia [Cranial Nerves Optic (II)] : visual acuity intact bilaterally,  visual fields full to confrontation, pupils equal round and reactive to light [Cranial Nerves Oculomotor (III)] : extraocular motion intact [Cranial Nerves Trigeminal (V)] : facial sensation intact symmetrically [Cranial Nerves Facial (VII)] : face symmetrical [Cranial Nerves Vestibulocochlear (VIII)] : hearing was intact bilaterally [Cranial Nerves Glossopharyngeal (IX)] : tongue and palate midline [Cranial Nerves Accessory (XI - Cranial And Spinal)] : head turning and shoulder shrug symmetric [Cranial Nerves Hypoglossal (XII)] : there was no tongue deviation with protrusion [Motor Tone] : muscle tone was normal in all four extremities [Motor Strength] : muscle strength was normal in all four extremities [Sensation Tactile Decrease] : light touch was intact [Sensation Pain / Temperature Decrease] : pain and temperature was intact [Sensation Vibration Decrease] : vibration was intact [Romberg's Sign] : Romberg's sign was negtive [Coordination - Dysmetria Impaired Finger-to-Nose Bilateral] : not present [2+] : Ankle jerk left 2+ [Plantar Reflex Right Only] : normal on the right [Plantar Reflex Left Only] : normal on the left [FreeTextEntry8] : Ambulation required a cane. [Optic Disc Abnormality] : the optic disc were normal in size and color

## 2024-04-10 NOTE — ASSESSMENT
[FreeTextEntry1] : This is a 58-year-old woman with multiple medical conditions. She was found to have possible small fiber neuropathy. I have found the automated testing to be somewhat unreliable. The patient does have some pains in the feet however as well as subjective complaints of weakness of the feet. I will therefore obtain EMG and nerve conduction studies to assess more fully for neuropathy. I would also like to obtain blood test to look for treatable causes of neuropathy. She does have a history of rheumatoid arthritis that can sometimes be associated with peripheral neuropathy.  She is already on gabapentin. I would recommend continuing this.  I will see her back after the testing.

## 2024-04-10 NOTE — CONSULT LETTER
[Dear  ___] : Dear  [unfilled], [Courtesy Letter:] : I had the pleasure of seeing your patient, [unfilled], in my office today. [Please see my note below.] : Please see my note below. [Consult Closing:] : Thank you very much for allowing me to participate in the care of this patient.  If you have any questions, please do not hesitate to contact me. [Sincerely,] : Sincerely, [FreeTextEntry3] : Larry Hernandez MD.

## 2024-05-06 LAB
ESTIMATED AVERAGE GLUCOSE: 117 MG/DL
FOLATE SERPL-MCNC: >20 NG/ML
HBA1C MFR BLD HPLC: 5.7 %
VIT B12 SERPL-MCNC: 692 PG/ML

## 2024-05-10 LAB — METHYLMALONATE SERPL-SCNC: 118 NMOL/L

## 2024-05-15 ENCOUNTER — APPOINTMENT (OUTPATIENT)
Dept: RHEUMATOLOGY | Facility: CLINIC | Age: 59
End: 2024-05-15
Payer: MEDICARE

## 2024-05-15 VITALS — TEMPERATURE: 97.3 F | HEIGHT: 66 IN | SYSTOLIC BLOOD PRESSURE: 124 MMHG | DIASTOLIC BLOOD PRESSURE: 82 MMHG

## 2024-05-15 DIAGNOSIS — Z79.899 OTHER LONG TERM (CURRENT) DRUG THERAPY: ICD-10-CM

## 2024-05-15 DIAGNOSIS — M13.0 POLYARTHRITIS, UNSPECIFIED: ICD-10-CM

## 2024-05-15 DIAGNOSIS — M06.9 RHEUMATOID ARTHRITIS, UNSPECIFIED: ICD-10-CM

## 2024-05-15 DIAGNOSIS — M25.50 PAIN IN UNSPECIFIED JOINT: ICD-10-CM

## 2024-05-15 DIAGNOSIS — R00.2 PALPITATIONS: ICD-10-CM

## 2024-05-15 DIAGNOSIS — M54.12 RADICULOPATHY, CERVICAL REGION: ICD-10-CM

## 2024-05-15 DIAGNOSIS — G62.9 POLYNEUROPATHY, UNSPECIFIED: ICD-10-CM

## 2024-05-15 LAB
BASOPHILS # BLD AUTO: 0.05 K/UL
BASOPHILS NFR BLD AUTO: 0.9 %
EOSINOPHIL # BLD AUTO: 0.18 K/UL
EOSINOPHIL NFR BLD AUTO: 3.3 %
ERYTHROCYTE [SEDIMENTATION RATE] IN BLOOD BY WESTERGREN METHOD: 20 MM/HR
HCT VFR BLD CALC: 39 %
HGB BLD-MCNC: 11.9 G/DL
IMM GRANULOCYTES NFR BLD AUTO: 0.2 %
LYMPHOCYTES # BLD AUTO: 1.65 K/UL
LYMPHOCYTES NFR BLD AUTO: 29.9 %
MAN DIFF?: NORMAL
MCHC RBC-ENTMCNC: 21.9 PG
MCHC RBC-ENTMCNC: 30.5 GM/DL
MCV RBC AUTO: 71.7 FL
MONOCYTES # BLD AUTO: 0.49 K/UL
MONOCYTES NFR BLD AUTO: 8.9 %
NEUTROPHILS # BLD AUTO: 3.14 K/UL
NEUTROPHILS NFR BLD AUTO: 56.8 %
PLATELET # BLD AUTO: 275 K/UL
RBC # BLD: 5.44 M/UL
RBC # FLD: 15.4 %
WBC # FLD AUTO: 5.52 K/UL

## 2024-05-15 PROCEDURE — 99215 OFFICE O/P EST HI 40 MIN: CPT

## 2024-05-15 PROCEDURE — G2211 COMPLEX E/M VISIT ADD ON: CPT

## 2024-05-15 RX ORDER — CYCLOBENZAPRINE HYDROCHLORIDE 10 MG/1
10 TABLET, FILM COATED ORAL
Qty: 90 | Refills: 1 | Status: ACTIVE | COMMUNITY
Start: 2021-01-27 | End: 1900-01-01

## 2024-05-15 NOTE — PHYSICAL EXAM
[General Appearance - Alert] : alert [General Appearance - In No Acute Distress] : in no acute distress [General Appearance - Well Nourished] : well nourished [General Appearance - Well Developed] : well developed [Sclera] : the sclera and conjunctiva were normal [PERRL With Normal Accommodation] : pupils were equal in size, round, and reactive to light [Extraocular Movements] : extraocular movements were intact [Outer Ear] : the ears and nose were normal in appearance [Neck Appearance] : the appearance of the neck was normal [Jugular Venous Distention Increased] : there was no jugular-venous distention [Auscultation Breath Sounds / Voice Sounds] : lungs were clear to auscultation bilaterally [Heart Rate And Rhythm] : heart rate was normal and rhythm regular [Heart Sounds] : normal S1 and S2 [Heart Sounds Gallop] : no gallops [Murmurs] : no murmurs [Heart Sounds Pericardial Friction Rub] : no pericardial rub [Bowel Sounds] : normal bowel sounds [Abdomen Soft] : soft [Abdomen Tenderness] : non-tender [No CVA Tenderness] : no ~M costovertebral angle tenderness [No Spinal Tenderness] : no spinal tenderness [] : no rash [Skin Lesions] : no skin lesions [Sensation] : the sensory exam was normal to light touch and pinprick [Motor Exam] : the motor exam was normal [No Focal Deficits] : no focal deficits [Impaired Insight] : insight and judgment were intact [Oriented To Time, Place, And Person] : oriented to person, place, and time [Affect] : the affect was normal [Mood] : the mood was normal [Involuntary Movements] : no involuntary movements were seen [Musculoskeletal - Swelling] : no joint swelling seen

## 2024-05-16 LAB
ALBUMIN SERPL ELPH-MCNC: 4.3 G/DL
ALP BLD-CCNC: 95 U/L
ALT SERPL-CCNC: 23 U/L
ANION GAP SERPL CALC-SCNC: 11 MMOL/L
AST SERPL-CCNC: 22 U/L
BILIRUB SERPL-MCNC: 0.2 MG/DL
BUN SERPL-MCNC: 11 MG/DL
C3 SERPL-MCNC: 143 MG/DL
C4 SERPL-MCNC: 28 MG/DL
CALCIUM SERPL-MCNC: 9.1 MG/DL
CCP AB SER IA-ACNC: <8 UNITS
CHLORIDE SERPL-SCNC: 105 MMOL/L
CK SERPL-CCNC: 57 U/L
CO2 SERPL-SCNC: 26 MMOL/L
CREAT SERPL-MCNC: 0.74 MG/DL
CRP SERPL-MCNC: <3 MG/L
DSDNA AB SER-ACNC: <1 IU/ML
EGFR: 93 ML/MIN/1.73M2
GLUCOSE SERPL-MCNC: 79 MG/DL
HBV E AB SER QL: NONREACTIVE
HBV E AG SER QL: NONREACTIVE
POTASSIUM SERPL-SCNC: 4.5 MMOL/L
PROT SERPL-MCNC: 6.4 G/DL
RF+CCP IGG SER-IMP: NEGATIVE
RHEUMATOID FACT SER QL: <10 IU/ML
SODIUM SERPL-SCNC: 142 MMOL/L
TSH SERPL-ACNC: 2.23 UIU/ML

## 2024-05-17 LAB
HAV IGM SER QL: NONREACTIVE
HBV CORE IGG+IGM SER QL: REACTIVE
HBV CORE IGM SER QL: NONREACTIVE
HBV SURFACE AB SER QL: REACTIVE
HBV SURFACE AG SER QL: NONREACTIVE
HCV AB SER QL: NONREACTIVE
HCV S/CO RATIO: 0.1 S/CO
HEPATITIS A IGG ANTIBODY: REACTIVE
HEPATITIS A IGG ANTIBODY: REACTIVE

## 2024-05-18 LAB
ALBUMIN MFR SERPL ELPH: 59.2 %
ALBUMIN SERPL-MCNC: 3.8 G/DL
ALBUMIN/GLOB SERPL: 1.5 RATIO
ALPHA1 GLOB MFR SERPL ELPH: 4.9 %
ALPHA1 GLOB SERPL ELPH-MCNC: 0.3 G/DL
ALPHA2 GLOB MFR SERPL ELPH: 10.9 %
ALPHA2 GLOB SERPL ELPH-MCNC: 0.7 G/DL
B-GLOBULIN MFR SERPL ELPH: 11.1 %
B-GLOBULIN SERPL ELPH-MCNC: 0.7 G/DL
GAMMA GLOB FLD ELPH-MCNC: 0.9 G/DL
GAMMA GLOB MFR SERPL ELPH: 13.9 %
INTERPRETATION SERPL IEP-IMP: NORMAL
M PROTEIN SPEC IFE-MCNC: NORMAL
PROT SERPL-MCNC: 6.4 G/DL
PROT SERPL-MCNC: 6.4 G/DL

## 2024-05-20 PROBLEM — G62.9 NEUROPATHY: Status: ACTIVE | Noted: 2024-04-10

## 2024-05-20 PROBLEM — Z79.899 HIGH RISK MEDICATION USE: Status: ACTIVE | Noted: 2021-01-27

## 2024-05-20 LAB
M TB IFN-G BLD-IMP: NEGATIVE
QUANTIFERON TB PLUS MITOGEN MINUS NIL: 1.18 IU/ML
QUANTIFERON TB PLUS NIL: 0.05 IU/ML
QUANTIFERON TB PLUS TB1 MINUS NIL: 0 IU/ML
QUANTIFERON TB PLUS TB2 MINUS NIL: 0 IU/ML

## 2024-05-20 NOTE — ASSESSMENT
[FreeTextEntry1] : 58 year old female presents today for an f.u visit. Previously following Dr. Meza. Dx with RA and fibromyalgia. Previously on treatment with multiple agents- doesn't remember all names ( treated with MTX, humira, Actemra, etc). Currently on RTX 1000 mg IV q 6 months, cyclobenzaprine 10 mg daily.  Off leflunomide now as makes her depressed Off tenofovir- developed severe n/v.  Last RTX infusion 12/2023  - repeat labs - +Hep B core AB. Hep f.u. Does not wish to be treated further. She understands that she should consider starting prophylactic treatment. She is at risk for reactivation/ dissemination of Hep B. - QuantiFERON: negative - c/w RTX 1000mg IV q 6 months + premeds - c/w cyclobenzaprine 10 mg daily - c/w gabapentin 300mg BID-TID - c/w topical lidocaine 5% prn for pain - reviewed risk and benefits of medications - c/w pain management f.u - OT  Discussed treatment plan with the patient. The patient was given the opportunity to ask questions and all questions were answered to their satisfaction.

## 2024-05-20 NOTE — HISTORY OF PRESENT ILLNESS
[FreeTextEntry1] : Pt presenting today for a f.u visit for RA. Reports symptoms are stable.  Last seen 03/2024. Chart reviewed since LV.  Currently taking cyclobenzaprine 10 mg daily prn for pain, gabapentin 300mg BID-TID, also using lidocaine cream. Tolerating medication well. No side effects.  On RTX 1000mg IV q 6 months. Last RTX infusion 12/2023.  Today with mild joint pain/swelling/stiffness to the joints.  denies fever, chills, denies chest pain, chest palpitations, denies sob, denies nausea, vomiting, abdominal pain, denies rashes.  ROS otherwise unchanged from LV.

## 2024-05-22 RX ORDER — RITUXIMAB 10 MG/ML
500 INJECTION, SOLUTION INTRAVENOUS
Qty: 2 | Refills: 1 | Status: DISCONTINUED | COMMUNITY
Start: 2021-06-30 | End: 2024-05-22

## 2024-05-31 LAB
HEPB DNA PCR INT: NOT DETECTED
HEPB DNA PCR LOG: NOT DETECTED LOGIU/ML

## 2024-06-04 ENCOUNTER — APPOINTMENT (OUTPATIENT)
Dept: NEUROLOGY | Facility: CLINIC | Age: 59
End: 2024-06-04
Payer: MEDICARE

## 2024-06-04 PROCEDURE — 95909 NRV CNDJ TST 5-6 STUDIES: CPT

## 2024-06-04 PROCEDURE — 95886 MUSC TEST DONE W/N TEST COMP: CPT

## 2024-06-13 NOTE — PATIENT PROFILE ADULT. - NS PRO AD PATIENT TYPE
[facility]  H&P  Name: Jasson Carter 74 y.o. male I MRN: [id number]  Unit/Bed#: -01 I Date of Admission: 6/12/2024   Date of Service: 6/13/2024 I Hospital Day: 0      Assessment & Plan   * Pneumonia  Assessment & Plan  Presented to ED with left-sided chest pain, shortness of breath and sweating    CXR consistent with possible left lower lobe infiltrate  Urine strep and legionella ordered, Sputum culture ordered  Blood Cultures pending  Lab Results   Component Value Date    WBC 13.20 (H) 06/12/2024     Trend fever curve    Initially given ceftriaxone and azithromycin in the emergency department, will continue and monitor cultures and titrate therapy as indicated    Hyponatremia  Assessment & Plan  Presents to ED with shortness of breath and chest pain  Sodium on admission 127  Hypo-volemic on initial exam   Urine sodium, urine creatinine, urine osmo, and serum osmo ordered  TSH ordered     Was given IV fluid bolus in the ED, monitor BMP every 8 hours  Hold further IV fluids until repeat BMP checked    Lab Results   Component Value Date    SODIUM 127 (L) 06/12/2024    SODIUM 137 04/29/2022    SODIUM 133 (L) 12/11/2019        Essential hypertension  Assessment & Plan  Continue home meds           VTE Pharmacologic Prophylaxis: VTE Score: 4 Moderate Risk (Score 3-4) - Pharmacological DVT Prophylaxis Ordered: heparin.  Code Status: Level 1 - Full Code   Discussion with family: Patient declined call to .     Anticipated Length of Stay: Patient will be admitted on an observation basis with an anticipated length of stay of less than 2 midnights secondary to hyponatremia and pneumonia.    Total Time Spent on Date of Encounter in care of patient:  mins. This time was spent on one or more of the following: performing physical exam; counseling and coordination of care; obtaining or reviewing history; documenting in the medical record; reviewing/ordering tests, medications or  procedures; communicating with other healthcare professionals and discussing with patient's family/caregivers.    Chief Complaint: Chest pain    History of Present Illness:  Jasson Carter is a 74 y.o. male with a PMH of hypertension, hyperlipidemia, gout who presents with chest pain and left-sided with some shortness of breath.  Endorses no fever and chills feeling, does have shortness of breath with a significant cough.  Also associated with chest pain.  Denies any nausea vomiting abdominal pain.  Noes known sick contacts    Imaging in the ER suggestive of pneumonia with lab work suggesting infection along with hyponatremia. .    Review of Systems:  Review of Systems   Constitutional:  Negative for chills and fever.   HENT:  Negative for ear pain and sore throat.    Eyes:  Negative for pain and visual disturbance.   Respiratory:  Positive for cough and shortness of breath.    Cardiovascular:  Positive for chest pain. Negative for palpitations.   Gastrointestinal:  Negative for abdominal pain and vomiting.   Genitourinary:  Negative for dysuria and hematuria.   Musculoskeletal:  Negative for arthralgias and back pain.   Skin:  Negative for color change and rash.   Neurological:  Negative for seizures and syncope.   All other systems reviewed and are negative.      Past Medical and Surgical History:   Past Medical History:   Diagnosis Date    Allergic     Gout     Hypertension     PTSD (post-traumatic stress disorder)        Past Surgical History:   Procedure Laterality Date    APPENDECTOMY      CHOLECYSTECTOMY      TOTAL KNEE ARTHROPLASTY         Meds/Allergies:  Prior to Admission medications    Medication Sig Start Date End Date Taking? Authorizing Provider   allopurinol (ZYLOPRIM) 300 mg tablet Take 300 mg by mouth daily   Yes Historical Provider, MD   amitriptyline (ELAVIL) 10 mg tablet Take 1 tablet (10 mg total) by mouth daily at bedtime If not effective after 1 week may increase to 2 tablets 12/13/21   Yes Ruben Finch MD   cholecalciferol (VITAMIN D3) 1,000 units tablet Take 1,000 Units by mouth daily   Yes Historical Provider, MD   gabapentin (NEURONTIN) 100 mg capsule Take 100 mg by mouth 3 (three) times a day   Yes Historical Provider, MD   loratadine (CLARITIN) 10 mg tablet Take 10 mg by mouth daily   Yes Historical Provider, MD   losartan (COZAAR) 100 MG tablet Take 100 mg by mouth daily   Yes Historical Provider, MD   amoxicillin (AMOXIL) 500 MG tablet amoxicillin 500 mg tablet   take 4 tablets by mouth 1 hour PRIOR TO DENTAL PROCEDURES    Historical Provider, MD   calcium carbonate (OS-BONNIE) 600 MG tablet Take 600 mg by mouth 2 (two) times a day with meals    Historical Provider, MD   indomethacin (INDOCIN) 25 mg capsule Take 1 capsule (25 mg total) by mouth 3 (three) times a day as needed for mild pain 21   Nicol Vu,      I have reviewed home medications with patient personally.    Allergies:   Allergies   Allergen Reactions    Ativan [Lorazepam] Delirium    Fluoxetine Diarrhea     Other reaction(s): Diarrhea, Tremor, Sweating, Sweating, Diarrhea, Tremor, Sweating, Sweating, Diarrhea, Tremor, Sweating, Sweating    Levaquin [Levofloxacin] Itching    Venlafaxine      Other reaction(s): Tremor, Sweating, Tremor, Sweating       Social History:  Marital Status: /Civil Union   Occupation: None  Patient Pre-hospital Living Situation: Home  Patient Pre-hospital Level of Mobility: walks  Patient Pre-hospital Diet Restrictions: None  Substance Use History:   Social History     Substance and Sexual Activity   Alcohol Use Yes     Social History     Tobacco Use   Smoking Status Former    Current packs/day: 0.00    Average packs/day: 0.5 packs/day for 20.0 years (10.0 ttl pk-yrs)    Types: Cigarettes    Start date:     Quit date:     Years since quittin.4   Smokeless Tobacco Never     Social History     Substance and Sexual Activity   Drug Use Not Currently       Family  "History:  Family History   Problem Relation Age of Onset    Lung cancer Father        Physical Exam:     Vitals:   Blood Pressure: 158/90 (06/12/24 2340)  Pulse: 89 (06/12/24 2340)  Temperature: 98.4 °F (36.9 °C) (06/12/24 2328)  Temp Source: Temporal (06/12/24 2328)  Respirations: 18 (06/12/24 2328)  Height: 6' 4\" (193 cm) (06/12/24 2341)  Weight - Scale: 83 kg (183 lb) (06/12/24 2341)  SpO2: 95 % (06/12/24 2340)    Physical Exam  Vitals and nursing note reviewed.   Constitutional:       General: He is not in acute distress.     Appearance: Normal appearance. He is ill-appearing.   HENT:      Head: Normocephalic and atraumatic.      Nose: No congestion.      Mouth/Throat:      Mouth: Mucous membranes are moist.   Eyes:      Conjunctiva/sclera: Conjunctivae normal.   Cardiovascular:      Rate and Rhythm: Normal rate and regular rhythm.      Pulses: Normal pulses.      Heart sounds: Normal heart sounds. No murmur heard.  Pulmonary:      Effort: Pulmonary effort is normal. No respiratory distress.      Breath sounds: Rhonchi present.      Comments: Significant cough with deep breath  Abdominal:      General: Bowel sounds are normal.      Palpations: Abdomen is soft.      Tenderness: There is no abdominal tenderness.   Musculoskeletal:         General: Normal range of motion.      Right lower leg: No edema.      Left lower leg: No edema.   Skin:     General: Skin is warm and dry.   Neurological:      Mental Status: He is alert and oriented to person, place, and time.          Additional Data:     Lab Results:  Results from last 7 days   Lab Units 06/12/24 2002   WBC Thousand/uL 13.20*   HEMOGLOBIN g/dL 13.7   HEMATOCRIT % 40.0   PLATELETS Thousands/uL 187   SEGS PCT % 75   LYMPHO PCT % 12*   MONO PCT % 11   EOS PCT % 1     Results from last 7 days   Lab Units 06/12/24 2002   SODIUM mmol/L 127*   POTASSIUM mmol/L 4.2   CHLORIDE mmol/L 94*   CO2 mmol/L 26   BUN mg/dL 10   CREATININE mg/dL 0.63   ANION GAP mmol/L 7 " "  CALCIUM mg/dL 8.8   ALBUMIN g/dL 4.0   TOTAL BILIRUBIN mg/dL 1.65*   ALK PHOS U/L 76   ALT U/L 14   AST U/L 21   GLUCOSE RANDOM mg/dL 105             No results found for: \"HGBA1C\"        Lines/Drains:  Invasive Devices       Peripheral Intravenous Line  Duration             Peripheral IV 06/12/24 Left Antecubital <1 day                        Imaging: Personally reviewed the following imaging: chest xray  XR chest 1 view portable   ED Interpretation by Homar Guerrero DO (06/12 2153)   Signs of left lower lobe infiltrate.           EKG and Other Studies Reviewed on Admission:   EKG: Personally Reviewed.  Sinus with first-degree AV block rate 82.    ** Please Note: This note has been constructed using a voice recognition system. **    " Health Care Proxy (HCP)

## 2024-06-14 RX ORDER — ACETAMINOPHEN 500 MG/1
500 TABLET ORAL
Refills: 0 | Status: COMPLETED | OUTPATIENT
Start: 2024-06-14 | End: 1900-01-01

## 2024-06-14 RX ORDER — RITUXIMAB 10 MG/ML
500 INJECTION, SOLUTION INTRAVENOUS
Refills: 0 | Status: COMPLETED | OUTPATIENT
Start: 2024-06-14 | End: 1900-01-01

## 2024-06-14 RX ORDER — DIPHENHYDRAMINE HYDROCHLORIDE 50 MG/ML
50 INJECTION, SOLUTION INTRAMUSCULAR; INTRAVENOUS
Refills: 0 | Status: COMPLETED | OUTPATIENT
Start: 2024-06-14 | End: 1900-01-01

## 2024-06-14 RX ORDER — METHYLPREDNISOLONE 125 MG/2ML
125 INJECTION, POWDER, LYOPHILIZED, FOR SOLUTION INTRAMUSCULAR; INTRAVENOUS
Refills: 0 | Status: COMPLETED | OUTPATIENT
Start: 2024-06-14 | End: 1900-01-01

## 2024-06-17 ENCOUNTER — APPOINTMENT (OUTPATIENT)
Dept: RHEUMATOLOGY | Facility: CLINIC | Age: 59
End: 2024-06-17
Payer: MEDICARE

## 2024-06-17 VITALS
RESPIRATION RATE: 17 BRPM | HEART RATE: 90 BPM | SYSTOLIC BLOOD PRESSURE: 133 MMHG | DIASTOLIC BLOOD PRESSURE: 90 MMHG | OXYGEN SATURATION: 99 %

## 2024-06-17 VITALS
OXYGEN SATURATION: 97 % | HEART RATE: 80 BPM | TEMPERATURE: 98.1 F | SYSTOLIC BLOOD PRESSURE: 126 MMHG | DIASTOLIC BLOOD PRESSURE: 84 MMHG | RESPIRATION RATE: 17 BRPM

## 2024-06-17 VITALS
RESPIRATION RATE: 17 BRPM | SYSTOLIC BLOOD PRESSURE: 131 MMHG | OXYGEN SATURATION: 99 % | DIASTOLIC BLOOD PRESSURE: 84 MMHG | HEART RATE: 66 BPM

## 2024-06-17 PROCEDURE — 96374 THER/PROPH/DIAG INJ IV PUSH: CPT | Mod: 59

## 2024-06-17 PROCEDURE — 96415 CHEMO IV INFUSION ADDL HR: CPT

## 2024-06-17 PROCEDURE — 96413 CHEMO IV INFUSION 1 HR: CPT

## 2024-06-17 PROCEDURE — 96375 TX/PRO/DX INJ NEW DRUG ADDON: CPT | Mod: 59

## 2024-06-17 RX ORDER — METHYLPREDNISOLONE 125 MG/2ML
125 INJECTION, POWDER, LYOPHILIZED, FOR SOLUTION INTRAMUSCULAR; INTRAVENOUS
Qty: 0 | Refills: 0 | Status: COMPLETED | OUTPATIENT
Start: 2024-06-14

## 2024-06-17 RX ORDER — DIPHENHYDRAMINE HYDROCHLORIDE 50 MG/ML
50 INJECTION, SOLUTION INTRAMUSCULAR; INTRAVENOUS
Qty: 1 | Refills: 0 | Status: COMPLETED | OUTPATIENT
Start: 2024-06-14

## 2024-06-17 RX ORDER — ACETAMINOPHEN 500 MG/1
500 TABLET ORAL
Qty: 0 | Refills: 0 | Status: COMPLETED | OUTPATIENT
Start: 2024-06-14

## 2024-06-17 NOTE — HISTORY OF PRESENT ILLNESS
[Denies] : Denies [No] : No [Yes] : Yes [Declined] : Declined [Informed consent documented in EHR.] : Informed consent documented in EHR. [TB] : Tuberculosis screening [Hep acute panel] : Hepatitis acute panel [Right upper extremity] : Right upper extremity [22g] : 22g [Start Time: ___] : Medication Start Time: [unfilled] [Medication Name: ___] : Medication Name: [unfilled] [Total Amount Administered: ___] : Total Amount Administered: [unfilled] [IV discontinued. Intact. No signs or symptoms of IV complications noted. Time: ___] : IV discontinued. Intact. No signs or symptoms of IV complications noted. Time: [unfilled] [Patient  instructed to seek medical attention with signs and symptoms of adverse effects] : Patient  instructed to seek medical attention with signs and symptoms of adverse effects [Patient left unit in no acute distress] : Patient left unit in no acute distress [Medications administered as ordered and tolerated well.] : Medications administered as ordered and tolerated well. [End Time: ___] : Medication End Time: [unfilled] [de-identified] : 1001YH

## 2024-06-18 RX ORDER — RITUXIMAB 10 MG/ML
500 INJECTION, SOLUTION INTRAVENOUS
Qty: 0 | Refills: 0 | Status: COMPLETED | OUTPATIENT
Start: 2024-06-14

## 2024-07-01 ENCOUNTER — APPOINTMENT (OUTPATIENT)
Dept: RHEUMATOLOGY | Facility: CLINIC | Age: 59
End: 2024-07-01
Payer: MEDICARE

## 2024-07-01 VITALS
OXYGEN SATURATION: 97 % | SYSTOLIC BLOOD PRESSURE: 131 MMHG | RESPIRATION RATE: 17 BRPM | DIASTOLIC BLOOD PRESSURE: 90 MMHG | HEART RATE: 70 BPM

## 2024-07-01 VITALS
HEART RATE: 84 BPM | RESPIRATION RATE: 17 BRPM | OXYGEN SATURATION: 99 % | DIASTOLIC BLOOD PRESSURE: 83 MMHG | SYSTOLIC BLOOD PRESSURE: 123 MMHG

## 2024-07-01 PROCEDURE — 96374 THER/PROPH/DIAG INJ IV PUSH: CPT | Mod: 59

## 2024-07-01 PROCEDURE — 96415 CHEMO IV INFUSION ADDL HR: CPT

## 2024-07-01 PROCEDURE — 96413 CHEMO IV INFUSION 1 HR: CPT

## 2024-07-01 PROCEDURE — 96375 TX/PRO/DX INJ NEW DRUG ADDON: CPT | Mod: 59

## 2024-08-15 ENCOUNTER — APPOINTMENT (OUTPATIENT)
Dept: NEUROLOGY | Facility: CLINIC | Age: 59
End: 2024-08-15

## 2024-09-16 ENCOUNTER — APPOINTMENT (OUTPATIENT)
Dept: RHEUMATOLOGY | Facility: CLINIC | Age: 59
End: 2024-09-16
Payer: MEDICARE

## 2024-09-16 ENCOUNTER — LABORATORY RESULT (OUTPATIENT)
Age: 59
End: 2024-09-16

## 2024-09-16 VITALS
OXYGEN SATURATION: 97 % | DIASTOLIC BLOOD PRESSURE: 70 MMHG | BODY MASS INDEX: 24.43 KG/M2 | TEMPERATURE: 98.5 F | SYSTOLIC BLOOD PRESSURE: 100 MMHG | WEIGHT: 152 LBS | RESPIRATION RATE: 17 BRPM | HEIGHT: 66 IN | HEART RATE: 88 BPM

## 2024-09-16 DIAGNOSIS — M79.641 PAIN IN RIGHT HAND: ICD-10-CM

## 2024-09-16 DIAGNOSIS — G62.9 POLYNEUROPATHY, UNSPECIFIED: ICD-10-CM

## 2024-09-16 DIAGNOSIS — M54.12 RADICULOPATHY, CERVICAL REGION: ICD-10-CM

## 2024-09-16 DIAGNOSIS — Z79.899 OTHER LONG TERM (CURRENT) DRUG THERAPY: ICD-10-CM

## 2024-09-16 DIAGNOSIS — M13.0 POLYARTHRITIS, UNSPECIFIED: ICD-10-CM

## 2024-09-16 DIAGNOSIS — M25.50 PAIN IN UNSPECIFIED JOINT: ICD-10-CM

## 2024-09-16 DIAGNOSIS — M79.642 PAIN IN RIGHT HAND: ICD-10-CM

## 2024-09-16 DIAGNOSIS — M06.9 RHEUMATOID ARTHRITIS, UNSPECIFIED: ICD-10-CM

## 2024-09-16 PROCEDURE — 99215 OFFICE O/P EST HI 40 MIN: CPT

## 2024-09-16 PROCEDURE — G2211 COMPLEX E/M VISIT ADD ON: CPT

## 2024-09-16 NOTE — ASSESSMENT
[FreeTextEntry1] : 59 year old female presents today for an f.u visit. Previously following Dr. Meza. Dx with RA and fibromyalgia. Previously on treatment with multiple agents- doesn't remember all names ( treated with MTX, humira, Actemra, etc). Currently on RTX 1000 mg IV q 6 months, cyclobenzaprine 10 mg daily.  Off leflunomide now as makes her depressed Off tenofovir- developed severe n/v.  Last RTX infusion 07/2024  - repeat labs - +Hep B core AB. Hep f.u. Does not wish to be treated further. She understands that she should consider starting prophylactic treatment. She is at risk for reactivation/ dissemination of Hep B. - QuantiFERON: negative - c/w RTX 1000mg IV q 6 months + premeds - c/w cyclobenzaprine 10 mg daily - c/w gabapentin 300mg BID-TID - c/w topical lidocaine 5% prn for pain - reviewed risk and benefits of medications - c/w pain management f.u - OT  Discussed treatment plan with the patient. The patient was given the opportunity to ask questions and all questions were answered to their satisfaction.

## 2024-09-16 NOTE — PHYSICAL EXAM
[General Appearance - Alert] : alert [General Appearance - In No Acute Distress] : in no acute distress [General Appearance - Well Nourished] : well nourished [General Appearance - Well Developed] : well developed [Sclera] : the sclera and conjunctiva were normal [PERRL With Normal Accommodation] : pupils were equal in size, round, and reactive to light [Extraocular Movements] : extraocular movements were intact [Outer Ear] : the ears and nose were normal in appearance [Neck Appearance] : the appearance of the neck was normal [Jugular Venous Distention Increased] : there was no jugular-venous distention [Auscultation Breath Sounds / Voice Sounds] : lungs were clear to auscultation bilaterally [Heart Rate And Rhythm] : heart rate was normal and rhythm regular [Heart Sounds] : normal S1 and S2 [Heart Sounds Gallop] : no gallops [Murmurs] : no murmurs [Heart Sounds Pericardial Friction Rub] : no pericardial rub [Bowel Sounds] : normal bowel sounds [Abdomen Soft] : soft [Abdomen Tenderness] : non-tender [No CVA Tenderness] : no ~M costovertebral angle tenderness [No Spinal Tenderness] : no spinal tenderness [] : no rash [Skin Lesions] : no skin lesions [Sensation] : the sensory exam was normal to light touch and pinprick [No Focal Deficits] : no focal deficits [Motor Exam] : the motor exam was normal [Impaired Insight] : insight and judgment were intact [Oriented To Time, Place, And Person] : oriented to person, place, and time [Affect] : the affect was normal [Mood] : the mood was normal [Involuntary Movements] : no involuntary movements were seen [Musculoskeletal - Swelling] : no joint swelling seen

## 2025-01-08 ENCOUNTER — LABORATORY RESULT (OUTPATIENT)
Age: 60
End: 2025-01-08

## 2025-01-08 ENCOUNTER — APPOINTMENT (OUTPATIENT)
Dept: RHEUMATOLOGY | Facility: CLINIC | Age: 60
End: 2025-01-08
Payer: MEDICARE

## 2025-01-08 VITALS
HEIGHT: 66 IN | WEIGHT: 152 LBS | HEART RATE: 83 BPM | DIASTOLIC BLOOD PRESSURE: 80 MMHG | BODY MASS INDEX: 24.43 KG/M2 | TEMPERATURE: 98.1 F | OXYGEN SATURATION: 98 % | SYSTOLIC BLOOD PRESSURE: 100 MMHG

## 2025-01-08 DIAGNOSIS — Z79.899 OTHER LONG TERM (CURRENT) DRUG THERAPY: ICD-10-CM

## 2025-01-08 DIAGNOSIS — M54.12 RADICULOPATHY, CERVICAL REGION: ICD-10-CM

## 2025-01-08 DIAGNOSIS — M79.641 PAIN IN RIGHT HAND: ICD-10-CM

## 2025-01-08 DIAGNOSIS — M06.9 RHEUMATOID ARTHRITIS, UNSPECIFIED: ICD-10-CM

## 2025-01-08 DIAGNOSIS — M79.642 PAIN IN RIGHT HAND: ICD-10-CM

## 2025-01-08 DIAGNOSIS — M13.0 POLYARTHRITIS, UNSPECIFIED: ICD-10-CM

## 2025-01-08 DIAGNOSIS — M25.50 PAIN IN UNSPECIFIED JOINT: ICD-10-CM

## 2025-01-08 DIAGNOSIS — R76.8 OTHER SPECIFIED ABNORMAL IMMUNOLOGICAL FINDINGS IN SERUM: ICD-10-CM

## 2025-01-08 PROCEDURE — 99215 OFFICE O/P EST HI 40 MIN: CPT

## 2025-01-08 PROCEDURE — G2211 COMPLEX E/M VISIT ADD ON: CPT

## 2025-01-09 LAB
ALBUMIN SERPL ELPH-MCNC: 3.8 G/DL
ALP BLD-CCNC: 109 U/L
ALT SERPL-CCNC: 16 U/L
ANION GAP SERPL CALC-SCNC: 11 MMOL/L
AST SERPL-CCNC: 19 U/L
BILIRUB SERPL-MCNC: 0.3 MG/DL
BUN SERPL-MCNC: 13 MG/DL
CALCIUM SERPL-MCNC: 9 MG/DL
CCP AB SER IA-ACNC: <8 U/ML
CHLORIDE SERPL-SCNC: 103 MMOL/L
CK SERPL-CCNC: 32 U/L
CO2 SERPL-SCNC: 25 MMOL/L
CREAT SERPL-MCNC: 0.68 MG/DL
CRP SERPL-MCNC: 41 MG/L
EGFR: 100 ML/MIN/1.73M2
ERYTHROCYTE [SEDIMENTATION RATE] IN BLOOD BY WESTERGREN METHOD: 120 MM/HR
GLUCOSE SERPL-MCNC: 92 MG/DL
HAV IGM SER QL: NONREACTIVE
HBV CORE IGG+IGM SER QL: REACTIVE
HBV CORE IGM SER QL: NONREACTIVE
HBV E AB SER QL: NONREACTIVE
HBV E AG SER QL: NONREACTIVE
HBV SURFACE AB SER QL: REACTIVE
HBV SURFACE AG SER QL: NONREACTIVE
HCV AB SER QL: NONREACTIVE
HCV S/CO RATIO: 0.08 S/CO
HEPATITIS A IGG ANTIBODY: REACTIVE
HEPATITIS A IGG ANTIBODY: REACTIVE
HEPB DNA PCR INT: NOT DETECTED
HEPB DNA PCR LOG: NOT DETECTED LOGIU/ML
POTASSIUM SERPL-SCNC: 4.8 MMOL/L
PROT SERPL-MCNC: 6.2 G/DL
RF+CCP IGG SER-IMP: NEGATIVE
RHEUMATOID FACT SER QL: <10 IU/ML
SODIUM SERPL-SCNC: 140 MMOL/L
TSH SERPL-ACNC: 1.9 UIU/ML

## 2025-01-10 LAB
BASOPHILS # BLD AUTO: 0.05 K/UL
BASOPHILS NFR BLD AUTO: 0.5 %
EOSINOPHIL # BLD AUTO: 0.31 K/UL
EOSINOPHIL NFR BLD AUTO: 3.4 %
HCT VFR BLD CALC: 32.9 %
HGB BLD-MCNC: 9.6 G/DL
IMM GRANULOCYTES NFR BLD AUTO: 0.4 %
LYMPHOCYTES # BLD AUTO: 1.39 K/UL
LYMPHOCYTES NFR BLD AUTO: 15 %
MAN DIFF?: NORMAL
MCHC RBC-ENTMCNC: 19.6 PG
MCHC RBC-ENTMCNC: 29.2 G/DL
MCV RBC AUTO: 67 FL
MONOCYTES # BLD AUTO: 0.69 K/UL
MONOCYTES NFR BLD AUTO: 7.5 %
NEUTROPHILS # BLD AUTO: 6.77 K/UL
NEUTROPHILS NFR BLD AUTO: 73.2 %
PLATELET # BLD AUTO: 360 K/UL
RBC # BLD: 4.91 M/UL
RBC # FLD: 19 %
WBC # FLD AUTO: 9.25 K/UL

## 2025-01-13 LAB
M TB IFN-G BLD-IMP: NEGATIVE
QUANTIFERON TB PLUS MITOGEN MINUS NIL: 1.83 IU/ML
QUANTIFERON TB PLUS NIL: 0.04 IU/ML
QUANTIFERON TB PLUS TB1 MINUS NIL: 0 IU/ML
QUANTIFERON TB PLUS TB2 MINUS NIL: 0 IU/ML

## 2025-01-17 ENCOUNTER — APPOINTMENT (OUTPATIENT)
Dept: PHYSICAL MEDICINE AND REHAB | Facility: CLINIC | Age: 60
End: 2025-01-17

## 2025-01-17 VITALS
HEIGHT: 66 IN | BODY MASS INDEX: 22.02 KG/M2 | WEIGHT: 137 LBS | SYSTOLIC BLOOD PRESSURE: 117 MMHG | HEART RATE: 84 BPM | RESPIRATION RATE: 14 BRPM | DIASTOLIC BLOOD PRESSURE: 80 MMHG

## 2025-01-27 DIAGNOSIS — M06.9 RHEUMATOID ARTHRITIS, UNSPECIFIED: ICD-10-CM

## 2025-02-05 ENCOUNTER — NON-APPOINTMENT (OUTPATIENT)
Age: 60
End: 2025-02-05

## 2025-02-11 ENCOUNTER — APPOINTMENT (OUTPATIENT)
Dept: RHEUMATOLOGY | Facility: CLINIC | Age: 60
End: 2025-02-11
Payer: MEDICARE

## 2025-02-11 VITALS
OXYGEN SATURATION: 97 % | TEMPERATURE: 97.6 F | RESPIRATION RATE: 15 BRPM | SYSTOLIC BLOOD PRESSURE: 99 MMHG | DIASTOLIC BLOOD PRESSURE: 66 MMHG | HEART RATE: 81 BPM

## 2025-02-11 VITALS
OXYGEN SATURATION: 99 % | TEMPERATURE: 97.3 F | RESPIRATION RATE: 16 BRPM | SYSTOLIC BLOOD PRESSURE: 101 MMHG | DIASTOLIC BLOOD PRESSURE: 67 MMHG | HEART RATE: 90 BPM

## 2025-02-11 VITALS
HEART RATE: 86 BPM | OXYGEN SATURATION: 99 % | RESPIRATION RATE: 51 BRPM | SYSTOLIC BLOOD PRESSURE: 103 MMHG | DIASTOLIC BLOOD PRESSURE: 64 MMHG

## 2025-02-11 PROCEDURE — 96375 TX/PRO/DX INJ NEW DRUG ADDON: CPT | Mod: 59

## 2025-02-11 PROCEDURE — 96415 CHEMO IV INFUSION ADDL HR: CPT

## 2025-02-11 PROCEDURE — 96374 THER/PROPH/DIAG INJ IV PUSH: CPT | Mod: 59

## 2025-02-11 PROCEDURE — 96413 CHEMO IV INFUSION 1 HR: CPT

## 2025-02-18 ENCOUNTER — APPOINTMENT (OUTPATIENT)
Dept: GASTROENTEROLOGY | Facility: CLINIC | Age: 60
End: 2025-02-18
Payer: MEDICARE

## 2025-02-18 VITALS
BODY MASS INDEX: 21.69 KG/M2 | HEIGHT: 66 IN | OXYGEN SATURATION: 98 % | WEIGHT: 135 LBS | SYSTOLIC BLOOD PRESSURE: 108 MMHG | DIASTOLIC BLOOD PRESSURE: 77 MMHG | HEART RATE: 89 BPM

## 2025-02-18 DIAGNOSIS — A04.8 OTHER SPECIFIED BACTERIAL INTESTINAL INFECTIONS: ICD-10-CM

## 2025-02-18 DIAGNOSIS — Z86.0102 PERSONAL HISTORY OF HYPERPLASTIC COLON POLYPS: ICD-10-CM

## 2025-02-18 DIAGNOSIS — I10 ESSENTIAL (PRIMARY) HYPERTENSION: ICD-10-CM

## 2025-02-18 DIAGNOSIS — Z87.39 PERSONAL HISTORY OF OTHER DISEASES OF THE MUSCULOSKELETAL SYSTEM AND CONNECTIVE TISSUE: ICD-10-CM

## 2025-02-18 DIAGNOSIS — Z86.0100 PERSONAL HISTORY OF COLON POLYPS, UNSPECIFIED: ICD-10-CM

## 2025-02-18 DIAGNOSIS — Z86.79 PERSONAL HISTORY OF OTHER DISEASES OF THE CIRCULATORY SYSTEM: ICD-10-CM

## 2025-02-18 DIAGNOSIS — Z13.9 ENCOUNTER FOR SCREENING, UNSPECIFIED: ICD-10-CM

## 2025-02-18 DIAGNOSIS — Z83.719 FAMILY HISTORY OF COLON POLYPS, UNSPECIFIED: ICD-10-CM

## 2025-02-18 DIAGNOSIS — M25.50 PAIN IN UNSPECIFIED JOINT: ICD-10-CM

## 2025-02-18 DIAGNOSIS — Z86.39 PERSONAL HISTORY OF OTHER ENDOCRINE, NUTRITIONAL AND METABOLIC DISEASE: ICD-10-CM

## 2025-02-18 DIAGNOSIS — R63.4 ABNORMAL WEIGHT LOSS: ICD-10-CM

## 2025-02-18 DIAGNOSIS — R76.8 OTHER SPECIFIED ABNORMAL IMMUNOLOGICAL FINDINGS IN SERUM: ICD-10-CM

## 2025-02-18 DIAGNOSIS — G62.9 POLYNEUROPATHY, UNSPECIFIED: ICD-10-CM

## 2025-02-18 DIAGNOSIS — M06.9 RHEUMATOID ARTHRITIS, UNSPECIFIED: ICD-10-CM

## 2025-02-18 DIAGNOSIS — R10.11 RIGHT UPPER QUADRANT PAIN: ICD-10-CM

## 2025-02-18 PROCEDURE — 99204 OFFICE O/P NEW MOD 45 MIN: CPT

## 2025-02-18 RX ORDER — POLYETHYLENE GLYCOL-3350, SODIUM CHLORIDE, POTASSIUM CHLORIDE AND SODIUM BICARBONATE 420; 11.2; 5.72; 1.48 G/438.4G; G/438.4G; G/438.4G; G/438.4G
420 POWDER, FOR SOLUTION ORAL
Qty: 1 | Refills: 0 | Status: ACTIVE | COMMUNITY
Start: 2025-02-18 | End: 1900-01-01

## 2025-02-25 ENCOUNTER — APPOINTMENT (OUTPATIENT)
Dept: RHEUMATOLOGY | Facility: CLINIC | Age: 60
End: 2025-02-25
Payer: MEDICARE

## 2025-02-25 VITALS
TEMPERATURE: 98.2 F | OXYGEN SATURATION: 99 % | RESPIRATION RATE: 15 BRPM | SYSTOLIC BLOOD PRESSURE: 104 MMHG | DIASTOLIC BLOOD PRESSURE: 70 MMHG | HEART RATE: 89 BPM

## 2025-02-25 VITALS
OXYGEN SATURATION: 98 % | TEMPERATURE: 98 F | SYSTOLIC BLOOD PRESSURE: 95 MMHG | DIASTOLIC BLOOD PRESSURE: 65 MMHG | RESPIRATION RATE: 14 BRPM | HEART RATE: 74 BPM

## 2025-02-25 PROCEDURE — 96415 CHEMO IV INFUSION ADDL HR: CPT

## 2025-02-25 PROCEDURE — 96374 THER/PROPH/DIAG INJ IV PUSH: CPT | Mod: 59

## 2025-02-25 PROCEDURE — 96375 TX/PRO/DX INJ NEW DRUG ADDON: CPT | Mod: 59

## 2025-02-25 PROCEDURE — 96413 CHEMO IV INFUSION 1 HR: CPT

## 2025-02-25 RX ORDER — METHYLPREDNISOLONE 125 MG/2ML
125 INJECTION, POWDER, LYOPHILIZED, FOR SOLUTION INTRAMUSCULAR; INTRAVENOUS
Qty: 0 | Refills: 0 | Status: COMPLETED | OUTPATIENT
Start: 2025-02-19

## 2025-02-25 RX ORDER — ACETAMINOPHEN 500 MG/1
500 TABLET ORAL
Qty: 0 | Refills: 0 | Status: COMPLETED | OUTPATIENT
Start: 2025-02-19

## 2025-02-25 RX ORDER — RITUXIMAB 10 MG/ML
500 INJECTION, SOLUTION INTRAVENOUS
Qty: 0 | Refills: 0 | Status: COMPLETED | OUTPATIENT
Start: 2025-02-19

## 2025-02-25 RX ORDER — DIPHENHYDRAMINE HYDROCHLORIDE 50 MG/ML
50 INJECTION, SOLUTION INTRAMUSCULAR; INTRAVENOUS
Qty: 1 | Refills: 0 | Status: COMPLETED | OUTPATIENT
Start: 2025-02-19

## 2025-03-07 ENCOUNTER — NON-APPOINTMENT (OUTPATIENT)
Age: 60
End: 2025-03-07

## 2025-05-27 ENCOUNTER — APPOINTMENT (OUTPATIENT)
Dept: RHEUMATOLOGY | Facility: CLINIC | Age: 60
End: 2025-05-27
Payer: MEDICARE

## 2025-05-27 ENCOUNTER — LABORATORY RESULT (OUTPATIENT)
Age: 60
End: 2025-05-27

## 2025-05-27 VITALS
DIASTOLIC BLOOD PRESSURE: 84 MMHG | HEART RATE: 86 BPM | SYSTOLIC BLOOD PRESSURE: 128 MMHG | HEIGHT: 66 IN | OXYGEN SATURATION: 97 % | TEMPERATURE: 98.5 F

## 2025-05-27 DIAGNOSIS — Z79.899 OTHER LONG TERM (CURRENT) DRUG THERAPY: ICD-10-CM

## 2025-05-27 DIAGNOSIS — G62.9 POLYNEUROPATHY, UNSPECIFIED: ICD-10-CM

## 2025-05-27 DIAGNOSIS — M79.641 PAIN IN RIGHT HAND: ICD-10-CM

## 2025-05-27 DIAGNOSIS — M13.0 POLYARTHRITIS, UNSPECIFIED: ICD-10-CM

## 2025-05-27 DIAGNOSIS — M79.642 PAIN IN RIGHT HAND: ICD-10-CM

## 2025-05-27 DIAGNOSIS — M54.12 RADICULOPATHY, CERVICAL REGION: ICD-10-CM

## 2025-05-27 PROBLEM — Z87.898 HISTORY OF TACHYCARDIA: Status: RESOLVED | Noted: 2020-07-17 | Resolved: 2025-05-27

## 2025-05-27 LAB
ALBUMIN SERPL ELPH-MCNC: 3.7 G/DL
ALP BLD-CCNC: 109 U/L
ALT SERPL-CCNC: 35 U/L
ANION GAP SERPL CALC-SCNC: 13 MMOL/L
AST SERPL-CCNC: 25 U/L
BILIRUB SERPL-MCNC: 0.3 MG/DL
BUN SERPL-MCNC: 12 MG/DL
CALCIUM SERPL-MCNC: 9 MG/DL
CHLORIDE SERPL-SCNC: 102 MMOL/L
CK SERPL-CCNC: 29 U/L
CO2 SERPL-SCNC: 25 MMOL/L
CREAT SERPL-MCNC: 0.65 MG/DL
CRP SERPL-MCNC: 23 MG/L
EGFRCR SERPLBLD CKD-EPI 2021: 101 ML/MIN/1.73M2
GLUCOSE SERPL-MCNC: 62 MG/DL
POTASSIUM SERPL-SCNC: 4.5 MMOL/L
PROT SERPL-MCNC: 6 G/DL
RHEUMATOID FACT SER QL: <10 IU/ML
SODIUM SERPL-SCNC: 139 MMOL/L

## 2025-05-27 PROCEDURE — 99215 OFFICE O/P EST HI 40 MIN: CPT

## 2025-05-27 PROCEDURE — G2211 COMPLEX E/M VISIT ADD ON: CPT

## 2025-05-28 ENCOUNTER — TRANSCRIPTION ENCOUNTER (OUTPATIENT)
Age: 60
End: 2025-05-28

## 2025-05-28 ENCOUNTER — APPOINTMENT (OUTPATIENT)
Dept: GASTROENTEROLOGY | Facility: GI CENTER | Age: 60
End: 2025-05-28
Payer: MEDICARE

## 2025-05-28 ENCOUNTER — RESULT REVIEW (OUTPATIENT)
Age: 60
End: 2025-05-28

## 2025-05-28 ENCOUNTER — OUTPATIENT (OUTPATIENT)
Dept: OUTPATIENT SERVICES | Facility: HOSPITAL | Age: 60
LOS: 1 days | End: 2025-05-28
Payer: MEDICARE

## 2025-05-28 DIAGNOSIS — Z86.0100 PERSONAL HISTORY OF COLON POLYPS, UNSPECIFIED: ICD-10-CM

## 2025-05-28 DIAGNOSIS — Z83.2 FAMILY HISTORY OF DISEASES OF THE BLOOD AND BLOOD-FORMING ORGANS AND CERTAIN DISORDERS INVOLVING THE IMMUNE MECHANISM: ICD-10-CM

## 2025-05-28 DIAGNOSIS — Z87.898 PERSONAL HISTORY OF OTHER SPECIFIED CONDITIONS: ICD-10-CM

## 2025-05-28 DIAGNOSIS — M06.9 RHEUMATOID ARTHRITIS, UNSPECIFIED: ICD-10-CM

## 2025-05-28 DIAGNOSIS — Z90.49 ACQUIRED ABSENCE OF OTHER SPECIFIED PARTS OF DIGESTIVE TRACT: ICD-10-CM

## 2025-05-28 DIAGNOSIS — Z87.39 PERSONAL HISTORY OF OTHER DISEASES OF THE MUSCULOSKELETAL SYSTEM AND CONNECTIVE TISSUE: ICD-10-CM

## 2025-05-28 DIAGNOSIS — Z83.719 FAMILY HISTORY OF COLON POLYPS, UNSPECIFIED: ICD-10-CM

## 2025-05-28 DIAGNOSIS — Z86.0102 PERSONAL HISTORY OF HYPERPLASTIC COLON POLYPS: ICD-10-CM

## 2025-05-28 DIAGNOSIS — R76.8 OTHER SPECIFIED ABNORMAL IMMUNOLOGICAL FINDINGS IN SERUM: ICD-10-CM

## 2025-05-28 DIAGNOSIS — Z83.3 FAMILY HISTORY OF DIABETES MELLITUS: ICD-10-CM

## 2025-05-28 DIAGNOSIS — A04.8 OTHER SPECIFIED BACTERIAL INTESTINAL INFECTIONS: ICD-10-CM

## 2025-05-28 DIAGNOSIS — Z86.010 PERSONAL HISTORY OF COLON POLYPS: ICD-10-CM

## 2025-05-28 DIAGNOSIS — D50.9 IRON DEFICIENCY ANEMIA, UNSPECIFIED: ICD-10-CM

## 2025-05-28 DIAGNOSIS — Z86.79 PERSONAL HISTORY OF OTHER DISEASES OF THE CIRCULATORY SYSTEM: ICD-10-CM

## 2025-05-28 DIAGNOSIS — R63.4 ABNORMAL WEIGHT LOSS: ICD-10-CM

## 2025-05-28 DIAGNOSIS — Z86.39 PERSONAL HISTORY OF OTHER ENDOCRINE, NUTRITIONAL AND METABOLIC DISEASE: ICD-10-CM

## 2025-05-28 DIAGNOSIS — M25.50 PAIN IN UNSPECIFIED JOINT: ICD-10-CM

## 2025-05-28 DIAGNOSIS — I10 ESSENTIAL (PRIMARY) HYPERTENSION: ICD-10-CM

## 2025-05-28 LAB
BASOPHILS # BLD AUTO: 0.15 K/UL
BASOPHILS NFR BLD AUTO: 1.8 %
CCP AB SER IA-ACNC: <8 U/ML
EOSINOPHIL # BLD AUTO: 0.15 K/UL
EOSINOPHIL NFR BLD AUTO: 1.8 %
ERYTHROCYTE [SEDIMENTATION RATE] IN BLOOD BY WESTERGREN METHOD: 98 MM/HR
HAV IGM SER QL: NONREACTIVE
HBV CORE IGG+IGM SER QL: REACTIVE
HBV CORE IGM SER QL: NONREACTIVE
HBV DNA # SERPL NAA+PROBE: NOT DETECTED IU/ML
HBV E AB SER QL: NONREACTIVE
HBV E AG SER QL: NONREACTIVE
HBV SURFACE AB SER QL: REACTIVE
HBV SURFACE AG SER QL: NONREACTIVE
HCT VFR BLD CALC: 35.8 %
HCV AB SER QL: NONREACTIVE
HCV S/CO RATIO: 0.11 S/CO
HEPATITIS A IGG ANTIBODY: REACTIVE
HEPATITIS A IGG ANTIBODY: REACTIVE
HEPB DNA PCR INT: NOT DETECTED
HEPB DNA PCR LOG: NOT DETECTED LOGIU/ML
HGB BLD-MCNC: 10.2 G/DL
LYMPHOCYTES # BLD AUTO: 1.72 K/UL
LYMPHOCYTES NFR BLD AUTO: 20.5 %
MAN DIFF?: NORMAL
MCHC RBC-ENTMCNC: 19.5 PG
MCHC RBC-ENTMCNC: 28.5 G/DL
MCV RBC AUTO: 68.6 FL
MONOCYTES # BLD AUTO: 0.82 K/UL
MONOCYTES NFR BLD AUTO: 9.8 %
NEUTROPHILS # BLD AUTO: 5.47 K/UL
NEUTROPHILS NFR BLD AUTO: 65.2 %
PLATELET # BLD AUTO: 284 K/UL
RBC # BLD: 5.22 M/UL
RBC # FLD: 22 %
RF+CCP IGG SER-IMP: NEGATIVE
TSH SERPL-ACNC: 2.19 UIU/ML
WBC # FLD AUTO: 8.39 K/UL

## 2025-05-28 PROCEDURE — 45378 DIAGNOSTIC COLONOSCOPY: CPT

## 2025-05-28 PROCEDURE — 45378 DIAGNOSTIC COLONOSCOPY: CPT | Mod: 59,PT

## 2025-05-28 PROCEDURE — 88342 IMHCHEM/IMCYTCHM 1ST ANTB: CPT

## 2025-05-28 PROCEDURE — 88305 TISSUE EXAM BY PATHOLOGIST: CPT

## 2025-05-28 PROCEDURE — 88305 TISSUE EXAM BY PATHOLOGIST: CPT | Mod: 26

## 2025-05-28 PROCEDURE — 43239 EGD BIOPSY SINGLE/MULTIPLE: CPT

## 2025-05-28 PROCEDURE — 88342 IMHCHEM/IMCYTCHM 1ST ANTB: CPT | Mod: 26

## 2025-05-29 LAB
M TB IFN-G BLD-IMP: NEGATIVE
QUANTIFERON TB PLUS MITOGEN MINUS NIL: 1.08 IU/ML
QUANTIFERON TB PLUS NIL: 0.1 IU/ML
QUANTIFERON TB PLUS TB1 MINUS NIL: 0 IU/ML
QUANTIFERON TB PLUS TB2 MINUS NIL: 0 IU/ML

## 2025-06-03 LAB — SURGICAL PATHOLOGY STUDY: SIGNIFICANT CHANGE UP

## 2025-09-05 ENCOUNTER — LABORATORY RESULT (OUTPATIENT)
Age: 60
End: 2025-09-05

## 2025-09-05 LAB
ALBUMIN SERPL ELPH-MCNC: 3.9 G/DL
ALP BLD-CCNC: 110 U/L
ALT SERPL-CCNC: 25 U/L
ANION GAP SERPL CALC-SCNC: 8 MMOL/L
AST SERPL-CCNC: 28 U/L
BILIRUB SERPL-MCNC: 0.3 MG/DL
BUN SERPL-MCNC: 15 MG/DL
CALCIUM SERPL-MCNC: 9 MG/DL
CHLORIDE SERPL-SCNC: 105 MMOL/L
CO2 SERPL-SCNC: 27 MMOL/L
CREAT SERPL-MCNC: 0.65 MG/DL
EGFRCR SERPLBLD CKD-EPI 2021: 100 ML/MIN/1.73M2
GLUCOSE SERPL-MCNC: 123 MG/DL
POTASSIUM SERPL-SCNC: 5.1 MMOL/L
PROT SERPL-MCNC: 6.2 G/DL
SODIUM SERPL-SCNC: 140 MMOL/L

## 2025-09-06 LAB
BASOPHILS # BLD AUTO: 0.14 K/UL
BASOPHILS NFR BLD AUTO: 1.7 %
EOSINOPHIL # BLD AUTO: 0.37 K/UL
EOSINOPHIL NFR BLD AUTO: 4.4 %
HBV DNA # SERPL NAA+PROBE: NOT DETECTED IU/ML
HCT VFR BLD CALC: 36.2 %
HEPB DNA PCR INT: NOT DETECTED
HEPB DNA PCR LOG: NOT DETECTED LOGIU/ML
HGB BLD-MCNC: 10.6 G/DL
LYMPHOCYTES # BLD AUTO: 1.77 K/UL
LYMPHOCYTES NFR BLD AUTO: 20.9 %
MAN DIFF?: NORMAL
MCHC RBC-ENTMCNC: 20 PG
MCHC RBC-ENTMCNC: 29.3 G/DL
MCV RBC AUTO: 68.3 FL
MONOCYTES # BLD AUTO: 0.66 K/UL
MONOCYTES NFR BLD AUTO: 7.8 %
NEUTROPHILS # BLD AUTO: 5.52 K/UL
NEUTROPHILS NFR BLD AUTO: 65.2 %
PLATELET # BLD AUTO: 274 K/UL
RBC # BLD: 5.3 M/UL
RBC # FLD: 20 %
WBC # FLD AUTO: 8.46 K/UL

## 2025-09-15 DIAGNOSIS — M06.9 RHEUMATOID ARTHRITIS, UNSPECIFIED: ICD-10-CM

## 2025-09-15 DIAGNOSIS — M13.0 POLYARTHRITIS, UNSPECIFIED: ICD-10-CM

## 2025-09-15 RX ORDER — METHYLPREDNISOLONE SODIUM SUCCINATE 125 MG/2ML
125 INJECTION, POWDER, LYOPHILIZED, FOR SOLUTION INTRAMUSCULAR; INTRAVENOUS
Refills: 0 | Status: COMPLETED | OUTPATIENT
Start: 2025-09-15 | End: 1900-01-01

## 2025-09-15 RX ORDER — DIPHENHYDRAMINE HYDROCHLORIDE 50 MG/ML
50 INJECTION, SOLUTION INTRAMUSCULAR; INTRAVENOUS
Qty: 1 | Refills: 0 | Status: COMPLETED | OUTPATIENT
Start: 2025-09-15 | End: 1900-01-01

## 2025-09-15 RX ORDER — ACETAMINOPHEN 500 MG/1
500 TABLET ORAL
Refills: 0 | Status: COMPLETED | OUTPATIENT
Start: 2025-09-15 | End: 1900-01-01

## 2025-09-15 RX ORDER — RITUXIMAB 10 MG/ML
500 INJECTION, SOLUTION INTRAVENOUS
Refills: 0 | Status: COMPLETED | OUTPATIENT
Start: 2025-09-15 | End: 1900-01-01

## (undated) DEVICE — KIT DEFENDO 4 OLY 4 PC

## (undated) DEVICE — FORCEP ENDOJAW AGTR LC W NDL 2.8MM 230CM DISP